# Patient Record
Sex: FEMALE | NOT HISPANIC OR LATINO | Employment: UNEMPLOYED | URBAN - METROPOLITAN AREA
[De-identification: names, ages, dates, MRNs, and addresses within clinical notes are randomized per-mention and may not be internally consistent; named-entity substitution may affect disease eponyms.]

---

## 2021-12-22 ENCOUNTER — OFFICE VISIT (OUTPATIENT)
Dept: FAMILY MEDICINE | Facility: CLINIC | Age: 31
End: 2021-12-22
Payer: MEDICAID

## 2021-12-22 VITALS
BODY MASS INDEX: 44.23 KG/M2 | WEIGHT: 275.2 LBS | HEIGHT: 66 IN | TEMPERATURE: 98 F | HEART RATE: 103 BPM | OXYGEN SATURATION: 100 % | SYSTOLIC BLOOD PRESSURE: 124 MMHG | DIASTOLIC BLOOD PRESSURE: 87 MMHG

## 2021-12-22 DIAGNOSIS — E03.8 OTHER SPECIFIED HYPOTHYROIDISM: ICD-10-CM

## 2021-12-22 DIAGNOSIS — E03.9 HYPOTHYROIDISM, UNSPECIFIED TYPE: ICD-10-CM

## 2021-12-22 DIAGNOSIS — F89 DEVELOPMENTAL DISORDER: ICD-10-CM

## 2021-12-22 DIAGNOSIS — K59.00 CONSTIPATION, UNSPECIFIED CONSTIPATION TYPE: ICD-10-CM

## 2021-12-22 DIAGNOSIS — Z00.00 ROUTINE GENERAL MEDICAL EXAMINATION AT A HEALTH CARE FACILITY: ICD-10-CM

## 2021-12-22 DIAGNOSIS — E11.9 TYPE 2 DIABETES MELLITUS WITHOUT COMPLICATION, WITH LONG-TERM CURRENT USE OF INSULIN (H): Primary | ICD-10-CM

## 2021-12-22 DIAGNOSIS — E66.01 MORBID OBESITY (H): ICD-10-CM

## 2021-12-22 DIAGNOSIS — Z79.4 TYPE 2 DIABETES MELLITUS WITHOUT COMPLICATION, WITH LONG-TERM CURRENT USE OF INSULIN (H): Primary | ICD-10-CM

## 2021-12-22 DIAGNOSIS — E78.2 MIXED HYPERLIPIDEMIA: ICD-10-CM

## 2021-12-22 LAB
CHOLEST SERPL-MCNC: 197 MG/DL
CREAT UR-MCNC: 181 MG/DL
FASTING STATUS PATIENT QL REPORTED: ABNORMAL
HBA1C MFR BLD: 7.9 % (ref 0–5.6)
HDLC SERPL-MCNC: 43 MG/DL
LDLC SERPL CALC-MCNC: 131 MG/DL
MICROALBUMIN UR-MCNC: 7.5 MG/DL (ref 0–1.99)
MICROALBUMIN/CREAT UR: 41.4 MG/G CR
T4 FREE SERPL-MCNC: 0.88 NG/DL (ref 0.7–1.8)
TRIGL SERPL-MCNC: 113 MG/DL
TSH SERPL DL<=0.005 MIU/L-ACNC: 8.62 UIU/ML (ref 0.3–5)

## 2021-12-22 PROCEDURE — 83036 HEMOGLOBIN GLYCOSYLATED A1C: CPT | Performed by: FAMILY MEDICINE

## 2021-12-22 PROCEDURE — 99214 OFFICE O/P EST MOD 30 MIN: CPT | Mod: 25 | Performed by: FAMILY MEDICINE

## 2021-12-22 PROCEDURE — 99385 PREV VISIT NEW AGE 18-39: CPT | Mod: 25 | Performed by: FAMILY MEDICINE

## 2021-12-22 PROCEDURE — 0004A COVID-19,PF,PFIZER (12+ YRS): CPT | Performed by: FAMILY MEDICINE

## 2021-12-22 PROCEDURE — 84439 ASSAY OF FREE THYROXINE: CPT | Performed by: FAMILY MEDICINE

## 2021-12-22 PROCEDURE — 80061 LIPID PANEL: CPT | Performed by: FAMILY MEDICINE

## 2021-12-22 PROCEDURE — 84443 ASSAY THYROID STIM HORMONE: CPT | Performed by: FAMILY MEDICINE

## 2021-12-22 PROCEDURE — 91300 COVID-19,PF,PFIZER (12+ YRS): CPT | Performed by: FAMILY MEDICINE

## 2021-12-22 PROCEDURE — 82043 UR ALBUMIN QUANTITATIVE: CPT | Performed by: FAMILY MEDICINE

## 2021-12-22 PROCEDURE — 36415 COLL VENOUS BLD VENIPUNCTURE: CPT | Performed by: FAMILY MEDICINE

## 2021-12-22 RX ORDER — LISINOPRIL 2.5 MG/1
5 TABLET ORAL DAILY
Qty: 30 TABLET | Refills: 3 | Status: SHIPPED | OUTPATIENT
Start: 2021-12-22 | End: 2022-05-04

## 2021-12-22 RX ORDER — LEVOTHYROXINE SODIUM 200 UG/1
200 TABLET ORAL DAILY
COMMUNITY
End: 2021-12-22

## 2021-12-22 RX ORDER — HYDROXYZINE HYDROCHLORIDE 50 MG/1
50 TABLET, FILM COATED ORAL AT BEDTIME
Qty: 30 TABLET | Refills: 3 | Status: SHIPPED | OUTPATIENT
Start: 2021-12-22 | End: 2022-04-26

## 2021-12-22 RX ORDER — FOLIC ACID 1 MG/1
1 TABLET ORAL DAILY
COMMUNITY
End: 2021-12-22

## 2021-12-22 RX ORDER — LEVOTHYROXINE SODIUM 25 UG/1
25 TABLET ORAL DAILY
COMMUNITY
End: 2021-12-22

## 2021-12-22 RX ORDER — LEVOTHYROXINE SODIUM 200 UG/1
200 TABLET ORAL DAILY
Qty: 90 TABLET | Refills: 1 | Status: SHIPPED | OUTPATIENT
Start: 2021-12-22 | End: 2023-06-08

## 2021-12-22 RX ORDER — RISPERIDONE 2 MG/1
2 TABLET ORAL DAILY
Qty: 60 TABLET | Refills: 1 | Status: SHIPPED | OUTPATIENT
Start: 2021-12-22 | End: 2022-05-04

## 2021-12-22 RX ORDER — CALCIUM POLYCARBOPHIL 625 MG 625 MG/1
1 TABLET ORAL DAILY
Qty: 90 TABLET | Refills: 1 | Status: SHIPPED | OUTPATIENT
Start: 2021-12-22 | End: 2022-05-04

## 2021-12-22 RX ORDER — INSULIN GLARGINE 100 [IU]/ML
58 INJECTION, SOLUTION SUBCUTANEOUS AT BEDTIME
COMMUNITY
End: 2021-12-22

## 2021-12-22 RX ORDER — ATORVASTATIN CALCIUM 80 MG/1
80 TABLET, FILM COATED ORAL DAILY
Qty: 90 TABLET | Refills: 1 | Status: SHIPPED | OUTPATIENT
Start: 2021-12-22 | End: 2022-01-06

## 2021-12-22 RX ORDER — LISINOPRIL 2.5 MG/1
2.5 TABLET ORAL DAILY
Qty: 30 TABLET | Refills: 3 | Status: SHIPPED | OUTPATIENT
Start: 2021-12-22 | End: 2021-12-22

## 2021-12-22 RX ORDER — HYDROXYZINE HYDROCHLORIDE 25 MG/1
50 TABLET, FILM COATED ORAL AT BEDTIME
COMMUNITY
End: 2021-12-22

## 2021-12-22 RX ORDER — ACETAMINOPHEN 325 MG/1
325-650 TABLET ORAL EVERY 6 HOURS PRN
COMMUNITY
End: 2022-05-04

## 2021-12-22 RX ORDER — METFORMIN HCL 500 MG
2000 TABLET, EXTENDED RELEASE 24 HR ORAL
Qty: 120 TABLET | Refills: 3 | Status: SHIPPED | OUTPATIENT
Start: 2021-12-22 | End: 2022-04-26

## 2021-12-22 RX ORDER — ATORVASTATIN CALCIUM 40 MG/1
40 TABLET, FILM COATED ORAL DAILY
COMMUNITY
End: 2021-12-22

## 2021-12-22 RX ORDER — GLIMEPIRIDE 4 MG/1
4 TABLET ORAL
COMMUNITY
End: 2021-12-22

## 2021-12-22 RX ORDER — PANTOPRAZOLE SODIUM 40 MG/1
40 TABLET, DELAYED RELEASE ORAL DAILY
COMMUNITY
End: 2022-01-07

## 2021-12-22 RX ORDER — FOLIC ACID 1 MG/1
1 TABLET ORAL DAILY
Qty: 90 TABLET | Refills: 1 | Status: SHIPPED | OUTPATIENT
Start: 2021-12-22 | End: 2022-05-04

## 2021-12-22 RX ORDER — DIVALPROEX SODIUM 250 MG/1
250 TABLET, DELAYED RELEASE ORAL 3 TIMES DAILY
COMMUNITY
End: 2021-12-22

## 2021-12-22 RX ORDER — CALCIUM CARBONATE 500 MG/1
1 TABLET, CHEWABLE ORAL 2 TIMES DAILY
COMMUNITY
End: 2022-05-04

## 2021-12-22 RX ORDER — RISPERIDONE 2 MG/1
1 TABLET ORAL DAILY
COMMUNITY
End: 2021-12-22

## 2021-12-22 RX ORDER — LEVOTHYROXINE SODIUM 50 UG/1
50 TABLET ORAL DAILY
Qty: 60 TABLET | Refills: 0 | Status: SHIPPED | OUTPATIENT
Start: 2021-12-22 | End: 2022-03-15

## 2021-12-22 RX ORDER — MEDROXYPROGESTERONE ACETATE 150 MG/ML
150 INJECTION, SUSPENSION INTRAMUSCULAR
COMMUNITY

## 2021-12-22 RX ORDER — POLYETHYLENE GLYCOL 3350 17 G/17G
1 POWDER, FOR SOLUTION ORAL DAILY
COMMUNITY
End: 2022-01-07

## 2021-12-22 RX ORDER — DIVALPROEX SODIUM 250 MG/1
250 TABLET, DELAYED RELEASE ORAL 3 TIMES DAILY
Qty: 90 TABLET | Refills: 3 | Status: SHIPPED | OUTPATIENT
Start: 2021-12-22 | End: 2022-01-11

## 2021-12-22 RX ORDER — DIVALPROEX SODIUM 500 MG/1
1500 TABLET, EXTENDED RELEASE ORAL AT BEDTIME
Qty: 90 TABLET | Refills: 3 | Status: SHIPPED | OUTPATIENT
Start: 2021-12-22 | End: 2022-05-04

## 2021-12-22 RX ORDER — IBUPROFEN 200 MG
200 TABLET ORAL EVERY 4 HOURS PRN
COMMUNITY

## 2021-12-22 RX ORDER — GUAIFENESIN AND DEXTROMETHORPHAN HYDROBROMIDE 100; 10 MG/5ML; MG/5ML
5 SOLUTION ORAL EVERY 4 HOURS PRN
COMMUNITY
End: 2022-01-07

## 2021-12-22 ASSESSMENT — MIFFLIN-ST. JEOR: SCORE: 1990.05

## 2021-12-22 NOTE — PROGRESS NOTES
"  Female Physical Note    Concerns today:   Needs refills of all of meds  Just moved here from [place]. Has a history of generalized developmental disorder and had been living in a group home in [place] (which is where her mom lives). She was not doing well at this group home so dad decided to move her to [place]. She is now living with her father, Jonn, who is here with her at the visit today. She has been doing very well and there have been no behavior concerns (history of explosive disorder on problem list from [place]). They ran out of meds in the end of November, and were seen at [facility]. They had to pay out of pocket for some of her meds because her [place] insurance hadn't processed yet.    She has a history of type II diabetes. She takes lantus 64 units and 14 units novolog with meals. She is also on 2,000 mg of metformin. Checks blood sugars aboud three to four times a day    ROS:  CONSTITUTIONAL: no fatigue, no unexpected change in weight  SKIN: no worrisome rashes, no worrisome moles, no worrisome lesions  EYES: no acute vision problems or changes  ENT: no ear problems, no mouth problems, no throat problems  RESP: no significant cough, no shortness of breath  CV: no chest pain, no palpitations, no new or worsening peripheral edema  GI: no nausea, no vomiting, no constipation, no diarrhea    Sexually Active: No- had been on depo in the past for \"irregular periods\"; currently has period without much concerns  Sexual concerns: No   Contraception:not needed    Menarche: currently on period No LMP recorded.   STD History: Neg  Last Pap Smear Date: unsure, she knows she's had them before   Abnormal Pap History: None    Patient Active Problem List   Diagnosis     Morbid obesity (H)       Current Outpatient Medications   Medication Sig Dispense Refill     acetaminophen (TYLENOL) 325 MG tablet Take 325-650 mg by mouth every 6 hours as needed for mild pain       atorvastatin (LIPITOR) 40 MG tablet Take " 40 mg by mouth daily       calcium carbonate (TUMS) 500 MG chewable tablet Take 1 chew tab by mouth 2 times daily       Dextromethorphan-guaiFENesin  MG/5ML syrup Take 5 mLs by mouth every 4 hours as needed for cough       divalproex sodium delayed-release (DEPAKOTE) 250 MG DR tablet Take 250 mg by mouth 3 times daily       folic acid (FOLVITE) 1 MG tablet Take 1 mg by mouth daily       glimepiride (AMARYL) 4 MG tablet Take 4 mg by mouth every morning (before breakfast)       hydrOXYzine (ATARAX) 25 MG tablet Take 25 mg by mouth 3 times daily as needed for itching       ibuprofen (ADVIL/MOTRIN) 200 MG tablet Take 200 mg by mouth every 4 hours as needed for mild pain       insulin glargine (LANTUS VIAL) 100 UNIT/ML vial Inject 58 Units Subcutaneous At Bedtime       levothyroxine (SYNTHROID/LEVOTHROID) 200 MCG tablet Take 200 mcg by mouth daily       levothyroxine (SYNTHROID/LEVOTHROID) 25 MCG tablet Take 25 mcg by mouth daily       medroxyPROGESTERone (DEPO-PROVERA) 150 MG/ML IM injection Inject 150 mg into the muscle every 3 months       pantoprazole (PROTONIX) 40 MG EC tablet Take 40 mg by mouth daily       polyethylene glycol (MIRALAX) 17 GM/Dose powder Take 1 capful by mouth daily       risperiDONE (RISPERDAL) 2 MG tablet Take 1 mg by mouth daily       ABILIFY 15 MG OR TABS 1 tab q HS  0     CLONAZEPAM 0.5 MG OR TABS Takes 0.25mg 1 tab at 5pm  0     CLONIDINE HCL 0.1 MG OR TABS 1/2 tabBID  0     DEBROX 6.5 % OT SOLN apply 5 drops qd for 5 days qs 0     TOPAMAX 25 MG OR TABS 1 tab po q am, 2 tabs q pm  0       Past Medical History:   Diagnosis Date     Alcohol affecting fetus or  via placenta or breast milk      Attention deficit disorder with hyperactivity(314.01)      Obsessive-compulsive disorders      Unspecified delay in development(315.9)             Problem List Medication List and Allergy List were reviewed.    Patient is a new patient to this clinic.    Social History     Tobacco Use      [FreeTextEntry3] : Patient was seen seen examined with NP Annie, agree with above plan of care.\par  "Smoking status: Never Smoker     Smokeless tobacco: Never Used   Substance Use Topics     Alcohol use: Not on file     Single  Children ? no    Has anyone hurt you physically, for example by pushing, hitting, slapping or kicking you or forcing you to have sex? Denies  Do you feel threatened or controlled by a partner, ex-partner or anyone in your life? Denies    RISK BEHAVIORS AND HEALTHY HABITS:  Tobacco Use/Smoking: None  Illicit Drug Use: None  Do you use alcohol? No  Diet (5-7 servings of fruits/veg daily): No   Exercise (30 min accumulated most days):No  Dental Care: not yet in [place]   Calcium 1500 mg/d:  No  Seat Belt Use: Yes     Pap every 3 years for women 21-29. Recommended and patient accepted testing- would like to come back with female friend of the family for support at later date    Immunization History   Administered Date(s) Administered     DT (PEDS <7y) 07/24/1997     Hep B, Peds or Adolescent 1992     HepB 1992, 08/16/1993, 12/22/1993     HepB, Unspecified 08/16/1993, 12/22/1993     Hib (PRP-T) 01/12/1993, 04/19/1993, 11/15/1994     Hib, Unspecified 01/12/1993, 04/19/1993, 11/15/1994     Historical DTP/aP 01/12/1993, 04/19/1993, 08/16/1993, 11/15/1994, 07/24/1997     Influenza (IIV3) PF 02/15/2005     MMR 11/15/1994, 08/28/2000     OPV, trivalent, live 01/12/1993, 04/19/1993, 11/15/1994, 07/24/1997     Polio, Unspecified  01/12/1993, 04/19/1993, 11/15/1994, 07/24/1997     Td (Adult), Adsorbed 03/30/2004     Varicella 02/15/2005    Reviewed Immunization Record Today- patient reports that she had the flu shot in [place]. She has had two covid vaccines but has not yet had the booster, and would like this today.     EXAMINATION:   /87   Pulse 103   Temp 98  F (36.7  C)   Ht 1.676 m (5' 6\")   Wt 124.8 kg (275 lb 3.2 oz)   SpO2 100%   BMI 44.42 kg/m    GENERAL: healthy, alert and no distress  EYES: Eyes grossly normal to inspection, extraocular movements - intact, and PERRL  HENT: " ear canals- normal; TMs- normal; Nose- normal; Mouth- no ulcers, no lesions  NECK: no tenderness, no adenopathy, no asymmetry, no masses, no stiffness; thyroid- normal to palpation  RESP: lungs clear to auscultation - no rales, no rhonchi, no wheezes  BREAST: deferred by patient  CV: regular rates and rhythm, normal S1 S2, no S3 or S4 and no murmur, no click or rub -  ABDOMEN: soft, no tenderness, no  hepatosplenomegaly, no masses, normal bowel sounds  MS: extremities- no gross deformities noted, no edema  SKIN: no suspicious lesions, no rashes  NEURO: strength and tone- normal, sensory exam- grossly normal, mentation- intact, speech- normal, reflexes- symmetric  BACK: no CVA tenderness, no paralumbar tenderness  - deferred by patient  PSYCH: Alert and oriented times 3; speech- coherent , normal rate and volume; able to articulate logical thoughts, able to abstract reason, no tangential thoughts, no hallucinations or delusions, affect- normal  LYMPHATICS: ant. cervical- normal, post. cervical- normal, axillary- normal, supraclavicular- normal, inguinal- normal    ASSESSMENT:  1. Type 2 diabetes mellitus without complication, with long-term current use of insulin (H)  Refilled metformin 2,000 mg daily (changed from immediate to extended release), refilled lantus 64 units daily, novolog 14 units with meals.   Was on glimepiride per med list (wasn't refilled by [facility]); agree that shouldn't be on both insulin and sulfonylurea. A1C today a little above goal at 7.9; could consider starting GLP-1 agonist in the future.  Micoalbuminuria increased today at 41; will increase lisinopril to 5 mg (at 2.5), BP will allow for this today  Refilled supplies and for   - Hemoglobin A1c; Future  - Lipid Profile; Future  - ALCOHOL WIPES PER BOX  - insulin glargine (LANTUS PEN) 100 UNIT/ML pen; Inject 64 Units Subcutaneous every morning  Dispense: 15 mL; Refill: 1  - insulin aspart (NOVOLOG PEN) 100 UNIT/ML pen; Inject 14 Units  "Subcutaneous 3 times daily (with meals)  Dispense: 15 mL; Refill: 1  - insulin pen needle (32G X 4 MM) 32G X 4 MM miscellaneous; Use 4 pen needles daily or as directed.  Dispense: 240 each; Refill: 1  - blood glucose (NO BRAND SPECIFIED) lancets standard; Use to test blood sugar 4 times daily or as directed.  Dispense: 240 each; Refill: 1  - blood glucose (NO BRAND SPECIFIED) test strip; Use to test blood sugar 4 times daily or as directed.  Dispense: 300 strip; Refill: 1  - Albumin Random Urine Quantitative with Creat Ratio; Future  - metFORMIN (GLUCOPHAGE-XR) 500 MG 24 hr tablet; Take 4 tablets (2,000 mg) by mouth daily (with dinner)  Dispense: 120 tablet; Refill: 3  - Adult Mental Health Referral; Future  - Hemoglobin A1c  - Lipid Profile  - Albumin Random Urine Quantitative with Creat Ratio  - lisinopril (ZESTRIL) 2.5 MG tablet; Take 2 tablets (5 mg) by mouth daily  Dispense: 30 tablet; Refill: 3    2. Hypothyroidism  Not at goal with TSH of 8.62; increased synthroid dose from 225 to 250; should get TSH rechecked in 4-6 weeks   - levothyroxine (SYNTHROID/LEVOTHROID) 200 MCG tablet; Take 1 tablet (200 mcg) by mouth daily  Dispense: 90 tablet; Refill: 1  - levothyroxine (SYNTHROID/LEVOTHROID) 50 MCG tablet; Take 1 tablet (50 mcg) by mouth daily  Dispense: 60 tablet; Refill: 0  - TSH with free T4 reflex; Future  - TSH with free T4 reflex  - T4 free    3. Morbid obesity (H)  Would likely be a good candidate for a GLP-1; will wait for endocrine notes as unclear from dad if has type I or type II (problem list says type II which would be consistent with meds she's on)    4. Routine general medical examination at a health care facility  Will return for Pap smear at later date    5. Developmental disorder  History of \"explosive\" disorder per problem list  Patient's father is concerned that she is on too many medications; will try to get psych records from [facility] and send referral for psychiatrist. Will likely need " long term psychiatric care  - risperiDONE (RISPERDAL) 2 MG tablet; Take 1 tablet (2 mg) by mouth daily  Dispense: 60 tablet; Refill: 1  - divalproex sodium delayed-release (DEPAKOTE) 250 MG DR tablet; Take 1 tablet (250 mg) by mouth 3 times daily  Dispense: 90 tablet; Refill: 3  - folic acid (FOLVITE) 1 MG tablet; Take 1 tablet (1 mg) by mouth daily  Dispense: 90 tablet; Refill: 1  - hydrOXYzine (ATARAX) 50 MG tablet; Take 1 tablet (50 mg) by mouth At Bedtime  Dispense: 30 tablet; Refill: 3  - divalproex sodium extended-release (DEPAKOTE ER) 500 MG 24 hr tablet; Take 3 tablets (1,500 mg) by mouth At Bedtime  Dispense: 90 tablet; Refill: 3  - Adult Mental Health Referral; Future    6. Constipation, unspecified constipation type  - calcium polycarbophil (FIBERCON) 625 MG tablet; Take 1 tablet (625 mg) by mouth daily  Dispense: 90 tablet; Refill: 1    7. Mixed hyperlipidemia  LDL at 131, above goal; will increase atorvastatin from 40 mg to 80  - atorvastatin (LIPITOR) 80 MG tablet; Take 1 tablet (80 mg) by mouth daily  Dispense: 90 tablet; Refill: 1      Adri Saunders MD MD PGY3  [facility]    Patient was seen and discussed with Dr. Johnston who agrees with assessment and plan.

## 2021-12-23 ENCOUNTER — TELEPHONE (OUTPATIENT)
Dept: FAMILY MEDICINE | Facility: CLINIC | Age: 31
End: 2021-12-23
Payer: MEDICAID

## 2021-12-23 NOTE — TELEPHONE ENCOUNTER
Spoke briefly with father Jonn and answered his questions. He was inquiring if Apple should take all of her medications prescribed yesterday on top of her old medications. Instructed that she should only take the medications prescribed by Dr. Meng yesterday, as these are the medications prescribed by her prior provider with a few dose adjustments.    Also offered CGM for patient as it would be covered and on multiple daily injections. Father will ask patient if she is interested.    Miles Chen, PharmD, [facility]  PGY1 Ambulatory Care Pharmacy Resident

## 2021-12-23 NOTE — TELEPHONE ENCOUNTER
Pt was prescribed new meds yesterday and dad would like to go over the meds with a nurse.  Please call.

## 2021-12-26 NOTE — TELEPHONE ENCOUNTER
I have verified the content of the note, which accurately reflects my assessment of the patient and the plan of care.   Selina Nolasco, [facility], PharmD

## 2022-01-06 ENCOUNTER — VIRTUAL VISIT (OUTPATIENT)
Dept: PHARMACY | Facility: CLINIC | Age: 32
End: 2022-01-06
Payer: MEDICAID

## 2022-01-06 ENCOUNTER — TELEPHONE (OUTPATIENT)
Dept: FAMILY MEDICINE | Facility: CLINIC | Age: 32
End: 2022-01-06

## 2022-01-06 DIAGNOSIS — E78.2 MIXED HYPERLIPIDEMIA: ICD-10-CM

## 2022-01-06 DIAGNOSIS — Z71.89 ENCOUNTER FOR MEDICATION REVIEW AND COUNSELING: Primary | ICD-10-CM

## 2022-01-06 PROBLEM — R62.50 DELAY IN DEVELOPMENT: Status: ACTIVE | Noted: 2022-01-06

## 2022-01-06 PROBLEM — Z78.9 LIVES IN GROUP HOME: Status: RESOLVED | Noted: 2022-01-06 | Resolved: 2022-01-06

## 2022-01-06 PROBLEM — Z78.9 LIVES IN GROUP HOME: Status: ACTIVE | Noted: 2022-01-06

## 2022-01-06 PROCEDURE — 99207 PR NO CHARGE LOS: CPT | Performed by: PHARMACIST

## 2022-01-06 RX ORDER — ATORVASTATIN CALCIUM 80 MG/1
80 TABLET, FILM COATED ORAL DAILY
Qty: 90 TABLET | Refills: 3 | Status: SHIPPED | OUTPATIENT
Start: 2022-01-06 | End: 2022-05-04

## 2022-01-06 NOTE — TELEPHONE ENCOUNTER
Father called and has some questions on which medications to take in the morning and which ones to take in the evening. The doses changed and with the new pills, he would like some clarification.    Route to the [facility].    Nikki Chavez

## 2022-01-06 NOTE — PROGRESS NOTES
Clinical Pharmacy Consult:                                                    Apple Holliday is a 31 year old female called for a clinical pharmacist consult.  She was referred to me from clinic staff. Information was obtained today from patient's father Jonn.      Reason for Consult: Questions about timing of medicine doses.     Discussion:   1. The patient visited [facility] on 11/22 and 12/8 with her medications prescribed on 11/22 and 12/20. She visited [facility] on 12/22 and had dose changes for atorvastatin, divalproex, levothyroxine, lisinopril, metformin and risperidone. The patient's father requested for clarifications on the patient's new medication regimens.   2. The patient kept atorvastatin 40 mg tablets from an outdated order and has not received atorvastatin 80 mg tablets. The patient's father acknowledged that he should  the new prescription from [facility] at [facility].  3. Informed the patient to take 3 tablets of divalproex 500 mg ER before bedtime.  4. The patient kept levothyroxine 25 mcg tablets from her visit with [facility]. She did not have the levothyroxine 50 mcg and 200 mcg tablets.   5. Informed the patient to take 4 tablets of metformin 500 mg ER with dinner instead of metformin 1000 mg two times daily.   6. Informed the patient to take 1 tablet of risperidone 2 mg daily at night and stop using risperidone 1 mg tablets.   7. The patient's use of calcium polycarbophil, folic acid, hydroxyzine, insulin, lisinopril, pantoprazole, and polyethylene glycol was not able to be evaluated due to time limit.     Plan:  1. Informed the patient to take 3 tablets of Divalproex 500 mg ER before bedtime.  2. Informed the patient to take 4 tablets of Metformin 500 mg ER before dinner.  3. Scheduled in-person visit on 1/7 at 8 am to complete the medication review.  4. Sent new Rx for previously prescribed Atorvastatin 80 mg to see if now fillable by insurance.     Future to do's:  - Clarify use of calcium  polycarbophil, folic acid, hydroxyzine, insulin, lisinopril, pantoprazole, and polyethylene glycol.  - Clarify Levothyroxine use, especially given uncontrolled TSH on last check  - Clarify divalproex use, unclear how the patient is taking the medication currently.   - Clarify glucose monitoring    Cheryl Peralta  PharmD Candidate 2022    Preceptor Attestation:  I was present with the pharmacy student who participated in the service and in the documentation of this note. I have verified the history, personally performed the medical decision making, and have verified the content of the note, which accurately reflects my assessment of the patient and the plan of care.     Alvina Valencia, PharmD, [facility] (previously, CDE)  [facility] [address], [place] [address]  Phone: [phone]  January 7, 2022 at 8:20 AM

## 2022-01-07 ENCOUNTER — OFFICE VISIT (OUTPATIENT)
Dept: PHARMACY | Facility: CLINIC | Age: 32
End: 2022-01-07
Payer: MEDICAID

## 2022-01-07 DIAGNOSIS — F99 MENTAL HEALTH DISORDER: ICD-10-CM

## 2022-01-07 DIAGNOSIS — E11.65 TYPE 2 DIABETES MELLITUS WITH HYPERGLYCEMIA, UNSPECIFIED WHETHER LONG TERM INSULIN USE (H): Primary | ICD-10-CM

## 2022-01-07 DIAGNOSIS — K21.9 GASTROESOPHAGEAL REFLUX DISEASE, UNSPECIFIED WHETHER ESOPHAGITIS PRESENT: ICD-10-CM

## 2022-01-07 DIAGNOSIS — E03.9 HYPOTHYROIDISM, UNSPECIFIED TYPE: ICD-10-CM

## 2022-01-07 DIAGNOSIS — E78.2 MIXED HYPERLIPIDEMIA: ICD-10-CM

## 2022-01-07 DIAGNOSIS — E11.29 MICROALBUMINURIA DUE TO TYPE 2 DIABETES MELLITUS (H): ICD-10-CM

## 2022-01-07 DIAGNOSIS — R80.9 MICROALBUMINURIA DUE TO TYPE 2 DIABETES MELLITUS (H): ICD-10-CM

## 2022-01-07 PROCEDURE — 99607 MTMS BY PHARM ADDL 15 MIN: CPT | Performed by: PHARMACIST

## 2022-01-07 PROCEDURE — 99605 MTMS BY PHARM NP 15 MIN: CPT | Performed by: PHARMACIST

## 2022-01-07 RX ORDER — PANTOPRAZOLE SODIUM 40 MG/1
40 TABLET, DELAYED RELEASE ORAL DAILY
Qty: 30 TABLET | Refills: 1 | Status: SHIPPED | OUTPATIENT
Start: 2022-01-07 | End: 2022-03-25

## 2022-01-07 NOTE — PROGRESS NOTES
Medication Therapy Management (MTM) Encounter    ASSESSMENT:                            Medication Adherence/Access: Recommended assistance to set up med boxes. Would benefit from bringing in all medications to complete full med rec.    Type 2 Diabetes: Not controlled. The patient's A1C was 7.9%--not at goal of < 7 %. Would benefit from GLP-1 to achieve glycemic goals and facilitate weight loss. Deferred to next visit given main concern was pill box set up and missing medications today. Also would benefit from CGM given on multiple daily doses of insulin--patient would like to think about this and will reassess next week.     Microalbuminuria: Stable, yet hasn't received increased dose of lisinopril from last visit. Would benefit from pharmacy inquiry.     Mental Health: Appears stable. Would benefit from verification from prior psychiatry records given discrepancies in medication reconciliation of divalproex. For now, would benefit on maintaining on 1500 mg of divalproex ER at bedtime as pharmacy did not fill the  mg tablets.     Mixed Hyperlipidemia: LDL above goal of < 100. However, prescribed atorvastatin 80 mg daily but patient does not fall into a ACC/AHA statin benefit group. Would benefit from charts from prior health system/provider assessment next visit.     Hypothyroidism: Likely uncontrolled--not receiving fully prescribed dose of 250 mcg per day due to pharmacy issue. Patient will benefit from using 200 mcg until her next visit on 1/11/22.     GERD: Stable, would benefit from refill on pantoprazole today with considerations to switch to H2RA in the future.    Due to time constraints, I was only able to assess the above with the patient today. Will follow-up on other disease states in future encounters    PLAN:                            1. Set up a 7-day medication box for the patient:   Morning before food: levothyroxine   Morning: folic acid, pantoprazole, lisinopril, risperidone,  atorvastatin   Evening: Empty (place for divalproex 250 mg DR)   Bedtime: divalproex 1500 mg ER, hydroxyzine, metformin  2. Take levothyroxine 200 mcg daily in the morning before food until 1/11--then we will add 50 mcg tablets to box.  3. The patient will consider starting CGM.   4. Called pharmacy to refill all medications that were missing from box.     Follow-up: 1/11 at 8 am with Dr. Chen.      Medication issues to be addressed at a future visit      Receive levothyroxine 50 mcg, atorvastatin 80 mg, and divalproex 250 mg DR tablets from the pharmacy and add to box next visit.    Discuss use of divalproex 250 mg DR tablets.    Discuss use of LANTUS and NOVOLOG.    Discuss starting CGM.    Discuss the rest of the patient's medication supplies--father bringing in ALL MEDICATIONS next visit.    Try to get records from patient's prior health system--unclear why patient is on atorvastatin.     Assess GERD w/ pantoprazole and consider swap to H2RA    SUBJECTIVE/OBJECTIVE:                          Apple Holliday is a 31 year old female coming in for an initial visit. Her father hope to have clarification of her current medication regimens. Patient was accompanied by her father Jonn.     Reason for visit: Clarification for medication regimens.    Allergies/ADRs: Reviewed in chart  Past Medical History: Reviewed in chart  Social History     Tobacco Use     Smoking status: Never Smoker     Smokeless tobacco: Never Used   Substance Use Topics     Alcohol use: Not on file     Drug use: Not on file   ^Reviewed today    Medication Adherence/Access: Medication barriers: polypharmacy. Brought in most recently prescribed medications. Does not have pill box but requests one today. Father states she has a ton of more medications at home and is willing to bring these in next visit to determine which ones he can get rid of and to avoid confusion.      Type 2 Diabetes:  Currently taking metformin 2000 mg, Novolog, and Lantus.  Patient is not experiencing side effects. Also taking cinnamon OTC.   Blood sugar monitorin time(s) daily. Ranges (patient reported): FPG Mid 150s  Symptoms of low blood sugar? none  Symptoms of high blood sugar? none  Diet/Exercise: Not addressed due to time  Aspirin: Not taking due to low risk of ASCVD  The ASCVD Risk score (Mena BAZZI Jr., et al., 2013) failed to calculate for the following reasons:    The 2013 ASCVD risk score is only valid for ages 40 to 79  Statin: Yes: Atorvastatin 80 mg daily   ACEi/ARB: Yes: lisinopril 5 mg daily.   Urine Albumin: No results found for: UMALCR   Lab Results   Component Value Date    A1C 7.9 2021    A1C 5.9 04/15/2003    A1C 6.1 2003     Most Recent Immunizations   Administered Date(s) Administered     COVID-19,PF,Pfizer (12+ Yrs) 2021     DT (PEDS <7y) 1997     Hep B, Peds or Adolescent 1992     HepB 1993     HepB, Unspecified 1993     Hib (PRP-T) 11/15/1994     Hib, Unspecified 11/15/1994     Historical DTP/aP 1997     Influenza (IIV3) PF 02/15/2005     MMR 2000     OPV, trivalent, live 1997     Polio, Unspecified  1997     Td (Adult), Adsorbed 2004     Varicella 02/15/2005      Microalbuminuria: Prescribed lisinopril 5 mg daily and denies side effects. Only taking 2.5 mg right now as was not able to receive 5 mg tablets from pharmacy.     Mental Health: Patient was prescribed divalproex  mg to take three times daily last visit and chart from [facility] suggests it should be 750 mg every morning. Also prescribed divalproex ER 1500 mg at bedtime. Only received the ER tablets from the pharmacy.     Mixed Hyperlipidemia: Prescribed atorvastatin 40 mg daily at [facility] system, increased to 80 mg last visit. No reports of side effects. No indication for secondary prevention on file.   Cholesterol   Date Value Ref Range Status   2021 197 <=199 mg/dL Final     Direct Measure HDL  "  Date Value Ref Range Status   12/22/2021 43 (L) >=50 mg/dL Final     Comment:     HDL Cholesterol Reference Range:     0-2 years:   No reference ranges established for patients under 2 years old  at [facility] for lipid analytes.    2-8 years:  Greater than 45 mg/dL     18 years and older:   Female: Greater than or equal to 50 mg/dL   Male:   Greater than or equal to 40 mg/dL     LDL Cholesterol Calculated   Date Value Ref Range Status   12/22/2021 131 (H) <=129 mg/dL Final     Triglycerides   Date Value Ref Range Status   12/22/2021 113 <=149 mg/dL Final     Hypothyroidism: The patient was prescribed with levothyroxine 250 mcg of daily from a recent visit. Her TSH value was high at 8.62 on 12/22 and she did not receive the levothyroxine 50 mcg from the pharmacy.   TSH   Date Value Ref Range Status   12/22/2021 8.62 (H) 0.30 - 5.00 uIU/mL Final     GERD: Taking pantoprazole 40 mg daily and no reports of side effects. Only has 5 pills remaining.     BP Readings from Last 1 Encounters:   12/22/21 124/87     Pulse Readings from Last 1 Encounters:   12/22/21 103     Wt Readings from Last 1 Encounters:   12/22/21 275 lb 3.2 oz (124.8 kg)     Ht Readings from Last 1 Encounters:   12/22/21 5' 6\" (1.676 m)     Estimated body mass index is 44.42 kg/m  as calculated from the following:    Height as of 12/22/21: 5' 6\" (1.676 m).    Weight as of 12/22/21: 275 lb 3.2 oz (124.8 kg).    Temp Readings from Last 1 Encounters:   12/22/21 98  F (36.7  C)     ----------------  I spent 60 minutes with this patient today. All changes were made via collaborative practice agreement with Dr. Meng. A copy of the visit note was provided to the patient's primary care provider.    The patient was given a summary of these recommendations. See Provider note/AVS from today.     Cheryl Peralta  PharmD Candidate 2022     Medication Therapy Recommendations  Diabetes mellitus, type 2 (H)    Current Medication: metFORMIN (GLUCOPHAGE-XR) " 500 MG 24 hr tablet   Rationale: Patient forgets to take - Adherence - Adherence   Recommendation: Provide Adherence Intervention   Status: Accepted - no CPA Needed              I was present with the pharmacy student who participated in the service and in the documentation of this note. I have verified the history, personally performed the medical decision making, and have verified the content of the note, which accurately reflects my assessment of the patient and the plan of care.     Miles Chen, PharmD, [facility]

## 2022-01-07 NOTE — PATIENT INSTRUCTIONS
Recommendations from today's MTM visit:                                                    MTM (medication therapy management) is a service provided by a clinical pharmacist designed to help you get the most of out of your medicines.   Today we reviewed what your medicines are for, how to know if they are working, that your medicines are safe and how to make your medicine regimen as easy as possible.      Medications to :  -Atorvastatin 80 mg  -Divalproex  mg  -Lisinopril 5 mg  -Lantus  -Pen Needles  -Levothyroxine 200 mcg  -Levothyroxine 25 mcg  -Pantoprazole 40 mg     Follow-up: 1/11/22    It was great to speak with you today!  I value your experience and would be very thankful for your time with providing feedback on our clinic survey. You may receive a survey via email or text message in the next few days.     To schedule another MTM appointment, please call the clinic directly or you may call the MTM scheduling line at [phone] or toll-free at [phone].     My Clinical Pharmacist's contact information:                                                      Please feel free to contact me with any questions or concerns you have!     Miles Chen, PharmD, [facility]  PGY1 Ambulatory [place] [address]  Office Phone: [phone]

## 2022-01-08 RX ORDER — AMPICILLIN TRIHYDRATE 250 MG
500 CAPSULE ORAL DAILY
COMMUNITY
Start: 2022-01-08 | End: 2022-05-04

## 2022-01-08 NOTE — PROGRESS NOTES
I have verified the content of the note, which accurately reflects my assessment of the patient and the plan of care.   Selina Nolasco, [facility], PharmD

## 2022-01-11 ENCOUNTER — OFFICE VISIT (OUTPATIENT)
Dept: PHARMACY | Facility: CLINIC | Age: 32
End: 2022-01-11
Payer: MEDICAID

## 2022-01-11 DIAGNOSIS — F99 MENTAL HEALTH DISORDER: ICD-10-CM

## 2022-01-11 DIAGNOSIS — E11.65 TYPE 2 DIABETES MELLITUS WITH HYPERGLYCEMIA, UNSPECIFIED WHETHER LONG TERM INSULIN USE (H): Primary | ICD-10-CM

## 2022-01-11 DIAGNOSIS — E78.2 MIXED HYPERLIPIDEMIA: ICD-10-CM

## 2022-01-11 DIAGNOSIS — E03.9 HYPOTHYROIDISM, UNSPECIFIED TYPE: ICD-10-CM

## 2022-01-11 PROCEDURE — 99606 MTMS BY PHARM EST 15 MIN: CPT | Performed by: PHARMACIST

## 2022-01-11 PROCEDURE — 99607 MTMS BY PHARM ADDL 15 MIN: CPT | Performed by: PHARMACIST

## 2022-01-11 RX ORDER — DIVALPROEX SODIUM 250 MG/1
750 TABLET, DELAYED RELEASE ORAL EVERY MORNING
Qty: 90 TABLET | Refills: 3 | COMMUNITY
Start: 2022-01-11 | End: 2022-02-01

## 2022-01-11 NOTE — PATIENT INSTRUCTIONS
Recommendations from today's MTM visit:                                                    MTM (medication therapy management) is a service provided by a clinical pharmacist designed to help you get the most of out of your medicines.   Today we reviewed what your medicines are for, how to know if they are working, that your medicines are safe and how to make your medicine regimen as easy as possible.           Morning Before Breakfast:     Levothyroxine 200 mcg 1 tablet    Levothyroxine 50 mcg 1 tablet (missing currently)         Other Morning:     Atorvastatin 80 mg 1 tablet (missing currently) *put in a couple days of 40 mg     Folic acid 1 mg 1 tablet    Lisinopril 2.5 mg 2 tablets    Pantoprazole 40 mg 1 tablet    Risperidone 2 mg 1 tablet    Evening:     Metformin  mg 4 tablets    Divalproex  mg 3 tablets    Hydroxyzine 50 mg 1 tablet    Follow-up: In about 1 week with Dr. Chen for Mark Education    It was great to speak with you today!  I value your experience and would be very thankful for your time with providing feedback on our clinic survey. You may receive a survey via email or text message in the next few days.     To schedule another MTM appointment, please call the clinic directly or you may call the MTM scheduling line at [phone] or toll-free at [phone].     My Clinical Pharmacist's contact information:                                                      Please feel free to contact me with any questions or concerns you have!     Miles Chen, PharmD, [facility]  PGY1 Ambulatory [place] [address]  Office Phone: [phone]

## 2022-01-11 NOTE — PROGRESS NOTES
"Medication Therapy Management (MTM) Encounter    ASSESSMENT:                            Medication Adherence/Access: Would benefit from continued use of pill boxes.     Type 2 Diabetes: Near goal of < 7 %. Would benefit from GLP-1 to achieve glycemic goals and facilitate weight loss. Deferred to next visit given main concern was pill box set up and missing medications today. Also would benefit from CGM given on multiple daily doses of insulin--patient has thought about this and would like to do a \"trial\" in the next few weeks.     Mental Health: Controlled per patient and father.  For now, would benefit on maintaining on 1500 mg of divalproex ER at bedtime with consideration to to re-prescribe the historical morning dose next visit with PCP.     Mixed Hyperlipidemia: LDL above goal of < 100. However, prescribed atorvastatin 80 mg daily but patient does not fall into a ACC/AHA statin benefit group. Would benefit from charts from prior health system/provider assessment next visit.     Hypothyroidism: Likely uncontrolled--not receiving fully prescribed dose of 250 mcg per day due to pharmacy issue. Patient will benefit from refill of 50 mcg of levothyroxine at this time.    PLAN:                            1. Set up a 7-day medication box for the patient. Placed PRN and old medications in a separate bag. Provided father with list of medications so he can set up box in the future:        Morning Before Breakfast:     Levothyroxine 200 mcg 1 tablet    Levothyroxine 50 mcg 1 tablet (missing currently) *put in a few days of left over 25 mcg         Other Morning:     Atorvastatin 80 mg 1 tablet (missing currently) *put in a 4 days of 40 mg left over    Folic acid 1 mg 1 tablet    Lisinopril 2.5 mg 2 tablets    Pantoprazole 40 mg 1 tablet    Risperidone 2 mg 1 tablet    Evening:     Metformin  mg 4 tablets    Divalproex  mg 3 tablets    Hydroxyzine 50 mg 1 tablet    2. Bring CGM supplies to next visit!    3. " Spoke briefly with patient about once weekly injection Bydureon. Patient is interested starting next time.     4. Atorvastatin 80 mg and levothyroxine 50 mcg are ready to be picked up at pharmacy--father did not go this weekend to .     Follow-up:  with Dr. Chen and PCP     Medication issues to be addressed at a future visit      Make sure patient received atorvastatin and levothyroxine 50 mcg.     Discuss use of morning divalproex 250 mg DR tablets with PCP    CGM education next visit    Try to get records from patient's prior health system--unclear why patient is on atorvastatin. Was LDL > 190 at one time?    Reassess GERD w/ pantoprazole and consider swap to H2RA    Consider switching all mid-morning medications to evening to promote adherence (although didn't miss doses this week).     SUBJECTIVE/OBJECTIVE:                          Apple Holliday is a 31 year old female coming in for an follow-up visit.  Patient was accompanied by her father Jonn. This is a follow-up visit from 22.    Reason for visit: Med rec/MTM    Allergies/ADRs: Reviewed in chart  Past Medical History: Reviewed in chart  Social History     Tobacco Use     Smoking status: Never Smoker     Smokeless tobacco: Never Used   Substance Use Topics     Alcohol use: Not on file     Drug use: Not on file   ^Reviewed today    Medication Adherence/Access: Brought in ALL medications in big tote today, including many old pill packs.     Type 2 Diabetes:  Currently taking metformin XR 2000 mg, Novolog 14 units TID, and Lantus 64 units every morning. Patient is not experiencing side effects. Also taking cinnamon OTC.   Blood sugar monitorin time(s) daily. Ranges (patient reported): The last weeks FPGs have been 100-110s. No reports of feeling low or any reading below 80.   Symptoms of low blood sugar? none  Symptoms of high blood sugar? none  Diet/Exercise: Not addressed due to time  Aspirin: Not taking due to low risk of  "ASCVD  Statin: Yes: Prescribed atorvastatin 80 mg daily   ACEi/ARB: Yes: lisinopril 5 mg daily.   Urine Albumin: No results found for: UMALCR   Lab Results   Component Value Date    A1C 7.9 12/22/2021    A1C 5.9 04/15/2003    A1C 6.1 03/05/2003     Most Recent Immunizations   Administered Date(s) Administered     COVID-19,PF,Pfizer (12+ Yrs) 12/22/2021     DT (PEDS <7y) 07/24/1997     Hep B, Peds or Adolescent 1992     HepB 12/22/1993     HepB, Unspecified 12/22/1993     Hib (PRP-T) 11/15/1994     Hib, Unspecified 11/15/1994     Historical DTP/aP 07/24/1997     Influenza (IIV3) PF 02/15/2005     MMR 08/28/2000     OPV, trivalent, live 07/24/1997     Polio, Unspecified  07/24/1997     Td (Adult), Adsorbed 03/30/2004     Varicella 02/15/2005      Mental Health: Patient was prescribed divalproex  mg to take three times daily last visit and chart from [facility] suggests it should be 750 mg every morning. Upon further review today (based on directions from old pharmacy), patient indeed was prescribed 750 mg of DR every morning. Also prescribed divalproex ER 1500 mg at bedtime and is taking without side effects. Per patient and father, mental health is \"doing great\" recently.    Mixed Hyperlipidemia: Prescribed atorvastatin 40 mg daily at prior health system, increased to 80 mg last visit. No reports of side effects. No indication for secondary prevention on file.   Cholesterol   Date Value Ref Range Status   12/22/2021 197 <=199 mg/dL Final     Direct Measure HDL   Date Value Ref Range Status   12/22/2021 43 (L) >=50 mg/dL Final     Comment:     HDL Cholesterol Reference Range:     0-2 years:   No reference ranges established for patients under 2 years old  at [facility] for lipid analytes.    2-8 years:  Greater than 45 mg/dL     18 years and older:   Female: Greater than or equal to 50 mg/dL   Male:   Greater than or equal to 40 mg/dL     LDL Cholesterol Calculated   Date Value Ref " "Range Status   12/22/2021 131 (H) <=129 mg/dL Final     Triglycerides   Date Value Ref Range Status   12/22/2021 113 <=149 mg/dL Final     Hypothyroidism: The patient was prescribed with levothyroxine 250 mcg of daily from a recent visit. Her TSH value was high at 8.62 on 12/22 and she did not receive the levothyroxine 50 mcg from the pharmacy. She presents with old 25 mcg tablets from the old pill pack pharmacy.   TSH   Date Value Ref Range Status   12/22/2021 8.62 (H) 0.30 - 5.00 uIU/mL Final     BP Readings from Last 1 Encounters:   12/22/21 124/87     Pulse Readings from Last 1 Encounters:   12/22/21 103     Wt Readings from Last 1 Encounters:   12/22/21 275 lb 3.2 oz (124.8 kg)     Ht Readings from Last 1 Encounters:   12/22/21 5' 6\" (1.676 m)     Estimated body mass index is 44.42 kg/m  as calculated from the following:    Height as of 12/22/21: 5' 6\" (1.676 m).    Weight as of 12/22/21: 275 lb 3.2 oz (124.8 kg).    Temp Readings from Last 1 Encounters:   12/22/21 98  F (36.7  C)     ----------------  I spent 50 minutes with this patient today. All changes were made via collaborative practice agreement with Dr. Meng. A copy of the visit note was provided to the patient's primary care provider.    The patient was given a summary of these recommendations. See Provider note/AVS from today.     Miles Chen, PharmD, [facility]  PGY1 Ambulatory Care Pharmacy Resident     Medication Therapy Recommendations  Diabetes mellitus, type 2 (H)    Current Medication: Continuous Blood Gluc Sensor (FREESTYLE CESAR 2 SENSOR) MISC   Rationale: Medication requires monitoring - Needs additional monitoring   Recommendation: Self-Monitoring   Status: Accepted per CPA                      "

## 2022-01-13 NOTE — PROGRESS NOTES
I have verified the content of the note, which accurately reflects my assessment of the patient and the plan of care.   Selina Nolasco, [facility], PharmD

## 2022-01-19 ENCOUNTER — OFFICE VISIT (OUTPATIENT)
Dept: PHARMACY | Facility: CLINIC | Age: 32
End: 2022-01-19
Payer: MEDICAID

## 2022-01-19 DIAGNOSIS — Z71.89 ENCOUNTER FOR MEDICATION COUNSELING: Primary | ICD-10-CM

## 2022-01-19 PROCEDURE — 99207 PR NO CHARGE LOS: CPT | Performed by: PHARMACIST

## 2022-01-19 NOTE — PATIENT INSTRUCTIONS
Recommendations from today's MTM visit:                                                    MTM (medication therapy management) is a service provided by a clinical pharmacist designed to help you get the most of out of your medicines.   Today we reviewed what your medicines are for, how to know if they are working, that your medicines are safe and how to make your medicine regimen as easy as possible.            Scan sensor with reader at least every 8 hours.      Change sensor every 14 days.      If the sensor falls off, call [phone]. Follow the prompts to the main menu and select option 2 (Technical Product Support, Training, and Replacements)      Bring your  into every visit so we can go over the results with you!    It was great to speak with you today.  I value your experience and would be very thankful for your time with providing feedback on our clinic survey. You may receive a survey via email or text message in the next few days.     To schedule another MTM appointment, please call the clinic directly or you may call the MTM scheduling line at [phone] or toll-free at [phone].     My Clinical Pharmacist's contact information:                                                      Please feel free to contact me with any questions or concerns you have.      Miles Chen, PharmD, [facility]  PGY1 Ambulatory Care Pharmacy Resident

## 2022-01-19 NOTE — PROGRESS NOTES
Clinical Pharmacy Consult:                                                    Apple Holliday is a 31 year old female seen for a clinical pharmacist consult.  She was referred to me from Dr. Meng. Patient presents with father today.    Reason for Consult: Medication Education - Freestyle Mark    Discussion: Patient requires Freestyle Mark education to ensure understanding and safety for blood sugar monitoring.     Plan:  1. Instructed the patient on proper sensor placement (back of upper arm) and cleaning with an alcohol wipe before application.     2. Guided patient on the proper steps to apply the sensor and observed the patient while they appropriately applied the sensor.    3. Instructed the patient on how to sync a new sensor to the Freestyle Mark [place] and set the  BG range of .     4. Instructed the patient on how to check blood sugar with Freestyle Mark, including how to interpret trend arrows. Emphasized to swipe at least every 8 hours.    5. Discussed with patient the difference between fingerstick glucose readings and sensor glucose readings (sensor glucose readings are 10 minutes behind fingerstick glucose readings). Instructed patient to check fingerstick glucose when directed by the Freestyle Mark or if signs/symptoms of hypoglycemia occur.    6. Instructed patient to change sensor every 14 days. Provided patient with phone number to call if a sensor were to fall off or malfunction ([phone]).    7. Instructed patient to bring back [place] to every visit.    By the end of this education session, patient was able to verbalize understanding of this information and was able to appropriately self-apply the sensor under my supervision.    PD4 student Cheryl Peralta offered the above counseling while I completed the documentation of this session.    Miles Chen, PharmD, [facility]  PGY1 Ambulatory Care Pharmacy Resident

## 2022-01-20 NOTE — PROGRESS NOTES
I have verified the content of the note, which accurately reflects my assessment of the patient and the plan of care.   Alvina Mills, NICOLASA, PharmD

## 2022-01-24 ENCOUNTER — TELEPHONE (OUTPATIENT)
Dept: FAMILY MEDICINE | Facility: CLINIC | Age: 32
End: 2022-01-24
Payer: MEDICAID

## 2022-01-24 NOTE — TELEPHONE ENCOUNTER
[facility] phone call message- general phone call:    Reason for call: Pts father wondering about blood sugars scanner that goes into pts skin fell out wondering if the old one goes in or if a new goes back into place.     Return call needed: Yes    OK to leave a message on voice mail? Yes    Primary language: English      needed? No    Call taken on January 24, 2022 at 11:39 AM by Grisel Flores-Cardona

## 2022-01-24 NOTE — TELEPHONE ENCOUNTER
Called father (Jonn) back and left a voicemail message:    1) Need to replace sensor; do not try to put the old one back in the arm. Throw out the old sensor and place a new one.  2) Call the company for them to send a replacement sensor as insurance won't cover it at the pharmacy:   [phone]    Selina Nolasco, Pharm.D.

## 2022-01-26 ENCOUNTER — OFFICE VISIT (OUTPATIENT)
Dept: PHARMACY | Facility: CLINIC | Age: 32
End: 2022-01-26
Payer: MEDICAID

## 2022-01-26 DIAGNOSIS — E11.9 TYPE 2 DIABETES MELLITUS WITHOUT COMPLICATION, WITH LONG-TERM CURRENT USE OF INSULIN (H): ICD-10-CM

## 2022-01-26 DIAGNOSIS — Z79.4 TYPE 2 DIABETES MELLITUS WITHOUT COMPLICATION, WITH LONG-TERM CURRENT USE OF INSULIN (H): ICD-10-CM

## 2022-01-26 PROCEDURE — 99606 MTMS BY PHARM EST 15 MIN: CPT | Performed by: PHARMACIST

## 2022-01-26 NOTE — PATIENT INSTRUCTIONS
"  Recommendations from today's MTM visit:                                                    MTM (medication therapy management) is a service provided by a clinical pharmacist designed to help you get the most of out of your medicines.   Today we reviewed what your medicines are for, how to know if they are working, that your medicines are safe and how to make your medicine regimen as easy as possible.            Scan sensor with reader at least every 8 hours.      Change sensor every 14 days.      If the sensor falls off, call [phone]. Follow the prompts to the main menu and select option 2 (Technical Product Support, Training, and Replacements)      Bring your  into every visit so we can go over the results with you!      Adriel quick code: Hit \"2\", then when you hear \"Hi\" hit 711.    It was great to speak with you today.  I value your experience and would be very thankful for your time with providing feedback on our clinic survey. You may receive a survey via email or text message in the next few days.     To schedule another MTM appointment, please call the clinic directly or you may call the MTM scheduling line at [phone] or toll-free at [phone].     My Clinical Pharmacist's contact information:                                                      Please feel free to contact me with any questions or concerns you have.      Miles Chen, PharmD, [facility]  PGY1 Ambulatory Care Pharmacy Resident            "

## 2022-01-26 NOTE — Clinical Note
"One of the QAs I did recently did such a good job including immunizations in their note. I rarely ever do this. So just passing on the reminder to you to that we are supposed to include immunizations in our CMM efforts. I know, sounds a little \"eye-roll-y\". :D    With upcoming physician appointments schedules, if I think of it I go in and if appropriate timing pend due labs (in her case BMP). Help out the physician and close your \"care gaps\".     Good job using the subjective dot phrases (diabetes)! (Another thing I don't do). Maybe we should pass on to the documentation committee that the Urine albumin isn't pulling correctly? She just had one done and it doesn't pull?!"

## 2022-01-26 NOTE — PROGRESS NOTES
Medication Therapy Management (MTM) Encounter    ASSESSMENT:                            Medication Adherence/Access: See below for considerations    Type 2 Diabetes: Near A1C goal of < 7%. Four days worth of data on ambulatory glucose profile show time in range achieving goal of > 70%. Would benefit from GLP-1 to achieve glycemic goals and facilitate weight loss. Deferred to next visit per patient request. Today, would benefit from providing patient with resources for Mark replacement and strategies for sensor sticking as sensor fell off after 4 days of wearing.     PLAN:                            1. Provided patient with phone # for Mark sensor replacements.     2. Worked out insurance issue with pen needles at pharmacy. Medical supplies MUST be prescribed under an attending's name and not a resident's name for them to go through her insurance. Verbally change perscriber from Dr. Meng to Dr. Cisneros.     3. Provided patient with Mark adherence sticky and recommended strategies for adherence (skin tac, Mark band, pressing around edge of sensor after application, and making sure skin is completely dry when applying).    4. Sent RXs for fingerstick blood glucose monitoring supplies to pharmacy per patient request.    Follow-up: 2/1/22 with Dr. Taqueria Saunders. Blue-dotted for Dr. Nolasco that day for follow-up.    Medication issues to be addressed at a future visit:      GLP-1 and insulin taper next visit! Spoke with patient about Bydureon BCise last visit    Reassess CGM data/sensor falling off.    SUBJECTIVE/OBJECTIVE:                          Apple Holliday is a 31 year old female called for a follow-up visit. Patient was accompanied by her father Jonn. Today's visit is a follow-up MTM visit from 1/11/22 with Dr. Chen     Reason for visit: MTM.    Allergies/ADRs: Reviewed in chart  Past Medical History: Reviewed in chart  Social History     Tobacco Use     Smoking status: Never Smoker     Smokeless  "tobacco: Never Used   Substance Use Topics     Alcohol use: Not on file     Drug use: Not on file   ^Reviewed today    Medication Adherence/Access: Per patient, misses medication 0 times per week. Father sets up pill box for her.      Type 2 Diabetes:  Currently taking metformin XR 2000 mg, Novolog 14 units TID, and Lantus 64 units every morning. Patient is not experiencing side effects. Also taking cinnamon OTC . Patient is not experiencing side effects.  Blood sugar monitoring: Continuous Glucose Monitor. Ranges (based on glucometer readings): See below. Sensor fell off after 4 days of wearing while she was sleeping.    Symptoms of low blood sugar? none  Symptoms of high blood sugar? none  Diet/Exercise: Not addressed this visit due to time  Aspirin: Not taking due to age  The ASCVD Risk score (Mena BAZZI Jr., et al., 2013) failed to calculate for the following reasons:    The 2013 ASCVD risk score is only valid for ages 40 to 79  Statin: Yes: Atorvastatin 80 mg daily   ACEi/ARB: Yes: Lisinopril 5 mg daily.   Urine Albumin: No results found for: UMALCR   Lab Results   Component Value Date    A1C 7.9 12/22/2021    A1C 5.9 04/15/2003    A1C 6.1 03/05/2003     Most Recent Immunizations   Administered Date(s) Administered     COVID-19,PF,Pfizer (12+ Yrs) 12/22/2021     DT (PEDS <7y) 07/24/1997     Hep B, Peds or Adolescent 1992     HepB 12/22/1993     HepB, Unspecified 12/22/1993     Hib (PRP-T) 11/15/1994     Hib, Unspecified 11/15/1994     Historical DTP/aP 07/24/1997     Influenza (IIV3) PF 02/15/2005     MMR 08/28/2000     OPV, trivalent, live 07/24/1997     Polio, Unspecified  07/24/1997     Td (Adult), Adsorbed 03/30/2004     Varicella 02/15/2005        BP Readings from Last 1 Encounters:   02/01/22 (!) 136/94     Pulse Readings from Last 1 Encounters:   02/01/22 110     Wt Readings from Last 1 Encounters:   02/01/22 292 lb (132.5 kg)     Ht Readings from Last 1 Encounters:   12/22/21 5' 6\" (1.676 m) " "    Estimated body mass index is 47.13 kg/m  as calculated from the following:    Height as of 12/22/21: 5' 6\" (1.676 m).    Weight as of 2/1/22: 292 lb (132.5 kg).    Temp Readings from Last 1 Encounters:   02/01/22 98.3  F (36.8  C) (Oral)     Unable to obtain vitals today due to staff availability/time.  ----------------  I spent 30 minutes with this patient today. Dr. Taqueria Saunders was provided the recommendations above via routed note and Dr. Cisneros is the authorizing prescriber for this visit through the pharmacist collaborative practice agreement.     The patient was given a summary of these recommendations.     Miles Chen, PharmD, [facility]  PGY1 Ambulatory Care Pharmacy Resident     Medication Therapy Recommendations  Diabetes mellitus, type 2 (H)    Current Medication: exenatide ER (BYDUREON BCISE) 2 MG/0.85ML auto-injector   Rationale: Synergistic therapy - Needs additional medication therapy - Indication   Recommendation: Start Medication - BYDUREON BCISE SC   Status: Accepted per CPA          Current Medication: exenatide ER (BYDUREON BCISE) 2 MG/0.85ML auto-injector   Rationale: Does not understand instructions - Adherence - Adherence   Recommendation: Provide Education   Status: Patient Agreed - Adherence/Education          Current Medication: insulin aspart (NOVOLOG PEN) 100 UNIT/ML pen   Rationale: Dose too high - Dosage too high - Safety   Recommendation: Decrease Dose   Status: Accepted per CPA          Current Medication: insulin glargine (LANTUS PEN) 100 UNIT/ML pen   Rationale: Dose too high - Dosage too high - Safety   Recommendation: Decrease Dose   Status: Accepted per CPA                 "

## 2022-02-01 ENCOUNTER — OFFICE VISIT (OUTPATIENT)
Dept: PHARMACY | Facility: CLINIC | Age: 32
End: 2022-02-01
Payer: MEDICAID

## 2022-02-01 ENCOUNTER — OFFICE VISIT (OUTPATIENT)
Dept: FAMILY MEDICINE | Facility: CLINIC | Age: 32
End: 2022-02-01
Payer: MEDICAID

## 2022-02-01 VITALS
BODY MASS INDEX: 47.13 KG/M2 | TEMPERATURE: 98.3 F | WEIGHT: 292 LBS | HEART RATE: 110 BPM | SYSTOLIC BLOOD PRESSURE: 136 MMHG | DIASTOLIC BLOOD PRESSURE: 94 MMHG | OXYGEN SATURATION: 98 % | RESPIRATION RATE: 18 BRPM

## 2022-02-01 DIAGNOSIS — Z79.4 TYPE 2 DIABETES MELLITUS WITHOUT COMPLICATION, WITH LONG-TERM CURRENT USE OF INSULIN (H): ICD-10-CM

## 2022-02-01 DIAGNOSIS — E11.9 TYPE 2 DIABETES MELLITUS WITHOUT COMPLICATION, WITH LONG-TERM CURRENT USE OF INSULIN (H): Primary | ICD-10-CM

## 2022-02-01 DIAGNOSIS — F99 MENTAL HEALTH DISORDER: ICD-10-CM

## 2022-02-01 DIAGNOSIS — Z12.4 CERVICAL CANCER SCREENING: Primary | ICD-10-CM

## 2022-02-01 DIAGNOSIS — Z79.4 TYPE 2 DIABETES MELLITUS WITHOUT COMPLICATION, WITH LONG-TERM CURRENT USE OF INSULIN (H): Primary | ICD-10-CM

## 2022-02-01 DIAGNOSIS — E11.9 TYPE 2 DIABETES MELLITUS WITHOUT COMPLICATION, WITH LONG-TERM CURRENT USE OF INSULIN (H): ICD-10-CM

## 2022-02-01 PROCEDURE — 99213 OFFICE O/P EST LOW 20 MIN: CPT | Mod: GC | Performed by: FAMILY MEDICINE

## 2022-02-01 PROCEDURE — 99606 MTMS BY PHARM EST 15 MIN: CPT | Performed by: PHARMACIST

## 2022-02-01 PROCEDURE — 99607 MTMS BY PHARM ADDL 15 MIN: CPT | Performed by: PHARMACIST

## 2022-02-01 RX ORDER — EXENATIDE 2 MG/.85ML
2 INJECTION, SUSPENSION, EXTENDED RELEASE SUBCUTANEOUS
Qty: 3.4 ML | Refills: 11 | Status: SHIPPED | OUTPATIENT
Start: 2022-02-01 | End: 2023-06-08

## 2022-02-01 NOTE — PATIENT INSTRUCTIONS
- START Bydureon 2mg once weekly  - DECREASE Lantus to 56 units daily  - DECREASE Novolog 12 units 3 times daily before meals    If you forget a dose of Bydureon:  - If it is 3 or more days to the time of your next dose, take the shot and go back to the normal day  - If it is 1 or 2 days to the time of your next dose, then skip that dose and go back to the normal day      SInce the Freestyle Mark sensor fell off, call [phone]. Follow the prompts to the main menu and select option 2 (Technical Product Support, Training, and Replacements). Ask for a replacement.
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
## 2022-02-09 NOTE — PROGRESS NOTES
Preceptor Attestation:    I discussed the patient with the resident and evaluated the patient in person. I have verified the content of the note, which accurately reflects my assessment of the patient and the plan of care.   Supervising Physician:  Pepe Johnston MD.

## 2022-02-18 ENCOUNTER — OFFICE VISIT (OUTPATIENT)
Dept: PHARMACY | Facility: CLINIC | Age: 32
End: 2022-02-18
Payer: MEDICAID

## 2022-02-18 ENCOUNTER — OFFICE VISIT (OUTPATIENT)
Dept: FAMILY MEDICINE | Facility: CLINIC | Age: 32
End: 2022-02-18
Payer: MEDICAID

## 2022-02-18 VITALS
HEART RATE: 112 BPM | BODY MASS INDEX: 47.29 KG/M2 | WEIGHT: 293 LBS | SYSTOLIC BLOOD PRESSURE: 128 MMHG | TEMPERATURE: 98.1 F | RESPIRATION RATE: 16 BRPM | OXYGEN SATURATION: 94 % | DIASTOLIC BLOOD PRESSURE: 83 MMHG

## 2022-02-18 DIAGNOSIS — Z79.4 TYPE 2 DIABETES MELLITUS WITHOUT COMPLICATION, WITH LONG-TERM CURRENT USE OF INSULIN (H): Primary | ICD-10-CM

## 2022-02-18 DIAGNOSIS — E11.9 TYPE 2 DIABETES MELLITUS WITHOUT COMPLICATION, WITH LONG-TERM CURRENT USE OF INSULIN (H): Primary | ICD-10-CM

## 2022-02-18 DIAGNOSIS — E66.01 MORBID OBESITY (H): ICD-10-CM

## 2022-02-18 DIAGNOSIS — L74.8 FOOT ODOR: ICD-10-CM

## 2022-02-18 PROCEDURE — 99214 OFFICE O/P EST MOD 30 MIN: CPT | Mod: GC | Performed by: FAMILY MEDICINE

## 2022-02-18 PROCEDURE — 99606 MTMS BY PHARM EST 15 MIN: CPT | Performed by: PHARMACIST

## 2022-02-18 PROCEDURE — 99607 MTMS BY PHARM ADDL 15 MIN: CPT | Performed by: PHARMACIST

## 2022-02-18 RX ORDER — PROCHLORPERAZINE 25 MG/1
1 SUPPOSITORY RECTAL
Qty: 1 EACH | Refills: 1 | Status: SHIPPED | OUTPATIENT
Start: 2022-02-18 | End: 2022-11-23

## 2022-02-18 RX ORDER — PROCHLORPERAZINE 25 MG/1
1 SUPPOSITORY RECTAL ONCE
Qty: 1 EACH | Refills: 0 | Status: SHIPPED | OUTPATIENT
Start: 2022-02-18 | End: 2022-02-18

## 2022-02-18 RX ORDER — PRENATAL VIT 91/IRON/FOLIC/DHA 28-975-200
COMBINATION PACKAGE (EA) ORAL 2 TIMES DAILY
Qty: 24 G | Refills: 1 | Status: SHIPPED | OUTPATIENT
Start: 2022-02-18

## 2022-02-18 RX ORDER — PROCHLORPERAZINE 25 MG/1
1 SUPPOSITORY RECTAL
Qty: 3 EACH | Refills: 5 | Status: SHIPPED | OUTPATIENT
Start: 2022-02-18 | End: 2022-11-23

## 2022-02-18 NOTE — PROGRESS NOTES
[facility]    Assessment/Plan:    Type 2 diabetes mellitus without complication, with long-term current use of insulin (H)  Freestyle page frequently falling off arm/not sensing blood sugars. Will change to DexCom system.  Also starting bydureon weekly- they wanted one more review of how to do the bydureon and to review the plan for going down on insulin.  Did teach back- they are comfortable with the plan to decrease lantus and novolog (they haven't started yet).  Also had questions about doing toenail clippings; had been doing them with podiatry in the past. Both patient and dad are comfortable with doing it themselves and nails are not dystrophic, no lesions- discussed that I am comfortable with them doing toenail clippings themselves if they are.   - Continuous Blood Gluc  (DEXCOM G6 ) PHILIPPE  Dispense: 1 each; Refill: 0  - Continuous Blood Gluc Transmit (DEXCOM G6 TRANSMITTER) MISC  Dispense: 1 each; Refill: 1  - Continuous Blood Gluc Sensor (DEXCOM G6 SENSOR) MISC  Dispense: 3 each; Refill: 5    Foot odor  Discussed foot hygiene.   - terbinafine (LAMISIL) 1 % external cream  Dispense: 24 g; Refill: 1          Adri Saunders MD  PGY3, Family Medicine    I staffed with Dr. Cisneros, who agrees with my assessment and plan.    Prescription drug management       Apple Holliday is a 31 year old female with a PMH of   Patient Active Problem List   Diagnosis     Morbid obesity (H)     Diabetes mellitus, type 2 (H)     Delay in development     presenting to clinic today with a chief complaint of:    Patient presents with:  Recheck Medication: needs help with DM sensors. Clarify insulin      Her freestyle page sensors keep falling off or don't read when placed. They are nervous about the bydureon and would like to review one more time how to do the injection. They haven't started the decreased insulin dosage yet.     She has been having smelly feet for a long time. No  itching, red areas.     Current Outpatient Medications   Medication Sig Dispense Refill     Continuous Blood Gluc  (DEXCOM G6 ) PHILIPPE 1 each once for 1 dose Use to read blood sugars as per 's instructions. 1 each 0     Continuous Blood Gluc Sensor (DEXCOM G6 SENSOR) MISC 1 each every 10 days Change every 10 days. 3 each 5     Continuous Blood Gluc Transmit (DEXCOM G6 TRANSMITTER) MISC 1 each every 3 months Change every 3 months. 1 each 1     terbinafine (LAMISIL) 1 % external cream Apply topically 2 times daily In between toes 24 g 1     acetaminophen (TYLENOL) 325 MG tablet Take 325-650 mg by mouth every 6 hours as needed for mild pain       atorvastatin (LIPITOR) 80 MG tablet Take 1 tablet (80 mg) by mouth daily 90 tablet 3     blood glucose (NO BRAND SPECIFIED) lancets standard Use to test blood sugar 4 times daily or as directed. 240 each 1     blood glucose (NO BRAND SPECIFIED) test strip Use to test blood sugar 4 times daily or as directed. 300 strip 1     blood glucose monitoring (NO BRAND SPECIFIED) meter device kit Use to test blood sugar 4 times daily or as directed. 1 kit 0     calcium carbonate (TUMS) 500 MG chewable tablet Take 1 chew tab by mouth 2 times daily       calcium polycarbophil (FIBERCON) 625 MG tablet Take 1 tablet (625 mg) by mouth daily 90 tablet 1     cinnamon 500 MG CAPS Take 500 mg by mouth daily       Continuous Blood Gluc Sensor (FREESTYLE CESAR 2 SENSOR) MISC 1 each every 14 days 1 each every 14 days. Change every 14 days. 6 each 3     divalproex sodium extended-release (DEPAKOTE ER) 500 MG 24 hr tablet Take 3 tablets (1,500 mg) by mouth At Bedtime 90 tablet 3     exenatide ER (BYDUREON BCISE) 2 MG/0.85ML auto-injector Inject 2 mg Subcutaneous every 7 days 3.4 mL 11     folic acid (FOLVITE) 1 MG tablet Take 1 tablet (1 mg) by mouth daily 90 tablet 1     hydrOXYzine (ATARAX) 50 MG tablet Take 1 tablet (50 mg) by mouth At Bedtime 30 tablet 3     ibuprofen  (ADVIL/MOTRIN) 200 MG tablet Take 200 mg by mouth every 4 hours as needed for mild pain       insulin aspart (NOVOLOG PEN) 100 UNIT/ML pen Inject 12 Units Subcutaneous 3 times daily (with meals) 15 mL 1     insulin glargine (LANTUS PEN) 100 UNIT/ML pen Inject 56 Units Subcutaneous every morning 15 mL 1     insulin pen needle (32G X 4 MM) 32G X 4 MM miscellaneous Use 4 pen needles daily or as directed. 100 each 11     levothyroxine (SYNTHROID/LEVOTHROID) 200 MCG tablet Take 1 tablet (200 mcg) by mouth daily 90 tablet 1     levothyroxine (SYNTHROID/LEVOTHROID) 50 MCG tablet Take 1 tablet (50 mcg) by mouth daily 60 tablet 0     lisinopril (ZESTRIL) 2.5 MG tablet Take 2 tablets (5 mg) by mouth daily 30 tablet 3     medroxyPROGESTERone (DEPO-PROVERA) 150 MG/ML IM injection Inject 150 mg into the muscle every 3 months       metFORMIN (GLUCOPHAGE-XR) 500 MG 24 hr tablet Take 4 tablets (2,000 mg) by mouth daily (with dinner) 120 tablet 3     pantoprazole (PROTONIX) 40 MG EC tablet Take 1 tablet (40 mg) by mouth daily 30 tablet 1     risperiDONE (RISPERDAL) 2 MG tablet Take 1 tablet (2 mg) by mouth daily 60 tablet 1       O: /83   Pulse 112   Temp 98.1  F (36.7  C)   Resp 16   Wt 132.9 kg (293 lb)   SpO2 94%   BMI 47.29 kg/m     Gen:  Well nourished and in no acute distress   HEENT: PERRL;  nasopharynx pink and moist; oropharynx pink and moist  Feet: feet sweating present with mild odor. No rashes or lesions. Nails normal and well trimmed.   Psych: Euthymic     This note was created using Dragon dictation system. Typos are not purposeful.

## 2022-02-18 NOTE — PATIENT INSTRUCTIONS
START Bydureon 2mg once weekly (educated her on proper technique; see below)  - DECREASE Lantus to 56 units daily  - DECREASE Novolog 12 units 3 times daily before meals     If you forget a dose of Bydureon:  - If it is 3 or more days to the time of your next dose, take the shot and go back to the normal day  - If it is 1 or 2 days to the time of your next dose, then skip that dose and go back to the normal day     SInce the Freestyle Mark sensor fell off, call [phone]. Follow the prompts to the main menu and select option 2 (Technical Product Support, Training, and Replacements). Ask for a replacement.     Bydureon BCise Education:     1. Instructed patient to store pens in the refrigerator and to take 1 pen out 15 minutes before use to allow it to come to room temperature.     2. Instructed patient on proper hygiene prior to administration including handwashing and cleaning administration site with alcohol swab.     3. Instructed patient to shake pen vigorously 15 times and how to check to ensure proper mixing.  Patient Education     Diabetes: Keeping Feet Healthy   Diabetes can damage nerves in your feet and cause weakness, numbness, and pain (neuropathy). This makes it hard to feel injuries or sore spots. Diabetes can also change blood flow. This can make it harder for small problems, like a blister, to heal. In fact, small injuries can quickly become serious infections that send you to the hospital. Practice self-care to protect your feet and keep them healthy.    Take special care   These tips can help you care for your feet:    Check your feet daily for problems such as swelling, redness, and blisters. Also looks for cracks, dry skin, or numbness. Use a mirror to see the bottoms of your feet. Or, ask for help. Try to check your feet at the same time every day.    Manage your diabetes. Check and control your blood sugar. Take all your medicines as prescribed. Call your provider if you have trouble  controlling your blood sugar.    Don't walk barefoot, even indoors. Always wear socks inside your shoes.     Wash your feet with warm water and mild soap. Check the water temperature before putting your foot in the water. Dry well, especially between toes.    Don t treat in-grown toenails, corns, or calluses yourself. Talk with your healthcare provider or foot doctor (podiatrist) if you need help trimming your toenails. Ask your podiatrist for a foot care plan.    Use moisturizing cream or lotion if you have dry skin. But, don t use it between toes.    Don t use heating pads on your feet. If you have nerve damage (neuropathy), you could get a burn and not feel it.    Stop smoking. Smoking limits blood flow. It can make it harder for wounds to heal.    Don't use sharp blades to trim your nails. Use a nail clipper and file instead.   Have regular checkups   Foot problems can develop fast. So be sure to follow your healthcare team s schedule for checkups. During office visits, take off your shoes and socks as soon as you get in the exam room. Ask your provider to check your feet for problems. This will make it easier to find and treat small skin irritations before they get worse. Regular checkups can also help keep track of the blood flow and feeling in your feet. You may need to have checkups more often if you have neuropathy.      Have your feet checked every time you see your healthcare provider, and at least once a year.     Wear correct footwear   Wearing correct footwear is very important. If areas of your feet have been damaged by too much pressure, your healthcare provider may advise changing your footwear. You may need to not wear high heels or tight work boots. Or, your healthcare provider may advise special shoes or inserts. These help protect your feet and keep existing problems from getting worse. If you need special footwear, ask your healthcare provider if you qualify for Medicare's custom-molded and  extra-depth diabetic shoe and insert program.    Make sure shoes and socks fit   Any pair of shoes--new or old--should feel comfortable as soon as you put them on. There shouldn t be any rubbing when you walk. Wear the right shoe for any activity. For instance, a running shoe is meant to keep your feet free from injury while you jog. Buy shoes at the end of the day, when your feet are larger. Check that they provide support without feeling too loose. Check that your socks fit, too. Wear soft, seamless, well-padded socks for activity. Cotton or microfiber socks are best to help to absorb sweat. To protect your feet, don't wear open-toed or open-heeled shoes. Talk with your healthcare team if you have questions about what kinds of shoes and socks are best   .  Get regular exercise   Regular exercise helps blood flow in your feet. It also helps make your feet stronger and more flexible. Gentle exercises such as walking or riding a stationary bike are best. You can also do special foot exercises. Talk with your healthcare provider before starting any exercise program. Also tell your provider if any exercise causes pain, redness, or other foot problems.      Note: If you have any kind of break in the skin of your foot or ankle, keep the area clean. Then call your healthcare provider. This is especially true if the area doesn t seem to be healing.   Jamin last reviewed this educational content on 11/1/2019 2000-2021 [employer]. All rights reserved. This information is not intended as a substitute for professional medical care. Always follow your healthcare professional's instructions.

## 2022-02-18 NOTE — PROGRESS NOTES
Preceptor Attestation:    I discussed the patient with the resident and evaluated the patient in person. I have verified the content of the note, which accurately reflects my assessment of the patient and the plan of care.   Supervising Physician:  Zac Cisneros MD.

## 2022-02-20 NOTE — PROGRESS NOTES
I have verified the content of the note, which accurately reflects my assessment of the patient and the plan of care.   Selina Nolasco, [facility], PharmD

## 2022-02-20 NOTE — PROGRESS NOTES
Medication Therapy Management (MTM) Encounter    ASSESSMENT:                            Medication Adherence/Access: See below for considerations    Type 2 Diabetes: Near A1C goal of < 7%, but unable to completely assess given lack of CGM or SMBG data. Patient has had continued issues with the Mark sensor falling off despite using adhesives to try to keep it in place. Therefore, would benefit from swapping CGM to Dexcom, as she wishes to try wearing a CGM on her abdomen. Additionally, patient did not feel comfortable administering Bydureon over the past few weeks--would benefit on education and reassurance today, along with the subsequent insulin reductions made by Dr. Nolasco on 2/1/22.     PLAN:                            1. Prescribed Dexcom G6 system. Patient will make appointment for next week for education.     2. Re-educated patient on how to administer Bydureon as outlined in the note from Dr. Nolasco on 2/1/22. Patient is ready to start today.     3. After today: Decrease Lantus to 56 units daily and decrease Novolog to 12 units three times daily before meals     Follow-up: In about 1 week with Dr. Chen for Dexcom Education    Medication issues to be addressed at a future visit:      Assess tolerance of Bydureon/insulin adjustments    Still unclear why patient is on atorvastatin given age. Was LDL > 190 at one time?    Reassess GERD w/ pantoprazole and consider discontinuing or swapping to H2RA.     SUBJECTIVE/OBJECTIVE:                          Apple Holliday is a 31 year old female called for a follow-up visit. Patient was accompanied by her father Jonn. Today is a co-visit with Dr. Meng. Today's visit is a follow-up MTM visit from 2/1/22 with Dr. Nolasco.     Reason for visit: MTM.    Allergies/ADRs: Reviewed in chart  Past Medical History: Reviewed in chart  Social History     Tobacco Use     Smoking status: Never Smoker     Smokeless tobacco: Never Used   Substance Use Topics     Alcohol use: Not  on file     Drug use: Not on file   ^Reviewed today    Medication Adherence/Access: Per patient, misses medication 0 times per week. Father sets up pill box for her.      Type 2 Diabetes:  Currently taking metformin XR 2000 mg, Novolog 14 units TID before meals, and Lantus 64 units every morning. Patient is not experiencing side effects. Also taking cinnamon OTC . Patient is not experiencing side effects.  Blood sugar monitoring: None--the sensor fell off again after a few days--no useful data to download today. Provided patient with a few adhesives to help the sensor stick, but the sensors continue to fall off. Patient interested in trying to wear sensor on belly. Has not been monitoring SMBG as patient does not wish to do it.  Symptoms of low blood sugar? None  Symptoms of high blood sugar? None  Diet/Exercise: Eager to start Bydureon to help curb her appetite.   Aspirin: Not taking due to age  The ASCVD Risk score (Mena JLUIS Jr., et al., 2013) failed to calculate for the following reasons:    The 2013 ASCVD risk score is only valid for ages 40 to 79  Statin: Yes: Atorvastatin 80 mg daily   ACEi/ARB: Yes: Lisinopril 5 mg daily.   Urine Albumin: No results found for: UMALCR   Lab Results   Component Value Date    A1C 7.9 12/22/2021    A1C 5.9 04/15/2003    A1C 6.1 03/05/2003     Most Recent Immunizations   Administered Date(s) Administered     COVID-19,PF,Pfizer (12+ Yrs) 12/22/2021     DT (PEDS <7y) 07/24/1997     Hep B, Peds or Adolescent 1992     HepB 12/22/1993     HepB, Unspecified 12/22/1993     Hib (PRP-T) 11/15/1994     Hib, Unspecified 11/15/1994     Historical DTP/aP 07/24/1997     Influenza (IIV3) PF 02/15/2005     MMR 08/28/2000     OPV, trivalent, live 07/24/1997     Polio, Unspecified  07/24/1997     Td (Adult), Adsorbed 03/30/2004     Varicella 02/15/2005        BP Readings from Last 1 Encounters:   02/18/22 128/83     Pulse Readings from Last 1 Encounters:   02/18/22 112     Wt Readings from  "Last 1 Encounters:   02/18/22 293 lb (132.9 kg)     Ht Readings from Last 1 Encounters:   12/22/21 5' 6\" (1.676 m)     Estimated body mass index is 47.29 kg/m  as calculated from the following:    Height as of 12/22/21: 5' 6\" (1.676 m).    Weight as of an earlier encounter on 2/18/22: 293 lb (132.9 kg).    Temp Readings from Last 1 Encounters:   02/18/22 98.1  F (36.7  C)     ----------------  I spent 15 minutes with this patient today. Dr. Taqueria Saunders was provided the recommendations above in clinic today note and Dr. Cisnreos is the authorizing prescriber for this visit through the pharmacist collaborative practice agreement. A copy of this visit note was not provided to the patient's PCP, as changes in clinic were discussed today.     The patient was given a summary of these recommendations.     Miles Chen, PharmD, [facility]  PGY1 Ambulatory Care Pharmacy Resident     Medication Therapy Recommendations  Diabetes mellitus, type 2 (H)    Current Medication: Continuous Blood Gluc Sensor (FREESTYLE CESAR 2 SENSOR) MISC   Rationale: Patient prefers not to take - Adherence - Adherence   Recommendation: Change Medication - Dexcom G6  Ammy   Status: Accepted per Provider               "

## 2022-03-14 DIAGNOSIS — E03.9 HYPOTHYROIDISM, UNSPECIFIED TYPE: ICD-10-CM

## 2022-03-15 RX ORDER — LEVOTHYROXINE SODIUM 50 UG/1
50 TABLET ORAL DAILY
Qty: 60 TABLET | Refills: 0 | Status: SHIPPED | OUTPATIENT
Start: 2022-03-15 | End: 2022-05-27

## 2022-03-21 ENCOUNTER — TELEPHONE (OUTPATIENT)
Dept: FAMILY MEDICINE | Facility: CLINIC | Age: 32
End: 2022-03-21
Payer: COMMERCIAL

## 2022-03-21 NOTE — TELEPHONE ENCOUNTER
[facility] phone call message- general phone call:    Reason for call: He needs a call back from a Pharm D re her medications.    Action desired: call back.    Return call needed: Yes    OK to leave a message on voice mail? Yes    Advised patient to response may take up to 2 business days: Yes    Primary language: English      needed? No    Call taken on March 21, 2022 at 2:49 PM by Osvaldo Jerome

## 2022-03-24 DIAGNOSIS — K21.9 GASTROESOPHAGEAL REFLUX DISEASE, UNSPECIFIED WHETHER ESOPHAGITIS PRESENT: ICD-10-CM

## 2022-03-25 RX ORDER — PANTOPRAZOLE SODIUM 40 MG/1
TABLET, DELAYED RELEASE ORAL
Qty: 30 TABLET | Refills: 1 | Status: SHIPPED | OUTPATIENT
Start: 2022-03-25 | End: 2022-05-04

## 2022-03-31 DIAGNOSIS — Z79.4 TYPE 2 DIABETES MELLITUS WITHOUT COMPLICATION, WITH LONG-TERM CURRENT USE OF INSULIN (H): ICD-10-CM

## 2022-03-31 DIAGNOSIS — E11.9 TYPE 2 DIABETES MELLITUS WITHOUT COMPLICATION, WITH LONG-TERM CURRENT USE OF INSULIN (H): ICD-10-CM

## 2022-04-01 RX ORDER — INSULIN GLARGINE 100 [IU]/ML
INJECTION, SOLUTION SUBCUTANEOUS
Qty: 15 ML | Refills: 1 | Status: SHIPPED | OUTPATIENT
Start: 2022-04-01 | End: 2022-05-04

## 2022-04-04 NOTE — TELEPHONE ENCOUNTER
I was out for the past 2 weeks so did not get this message until today.  I called back and left a message to call back if they still had questions about medications.    Claudy BrianD.      4/4/22, 3:50pm  Called back again and spoke with Jonn. He doesn't remember what the medication question was but he was not at home but he will look at his notes when he gets home and will call back if he still has a question.    Selina Nolasco Pharm.D.

## 2022-04-25 DIAGNOSIS — F89 DEVELOPMENTAL DISORDER: ICD-10-CM

## 2022-04-25 DIAGNOSIS — Z79.4 TYPE 2 DIABETES MELLITUS WITHOUT COMPLICATION, WITH LONG-TERM CURRENT USE OF INSULIN (H): ICD-10-CM

## 2022-04-25 DIAGNOSIS — E11.9 TYPE 2 DIABETES MELLITUS WITHOUT COMPLICATION, WITH LONG-TERM CURRENT USE OF INSULIN (H): ICD-10-CM

## 2022-04-26 RX ORDER — METFORMIN HCL 500 MG
2000 TABLET, EXTENDED RELEASE 24 HR ORAL
Qty: 120 TABLET | Refills: 3 | Status: SHIPPED | OUTPATIENT
Start: 2022-04-26 | End: 2022-05-04

## 2022-04-26 RX ORDER — HYDROXYZINE HYDROCHLORIDE 50 MG/1
50 TABLET, FILM COATED ORAL AT BEDTIME
Qty: 30 TABLET | Refills: 3 | Status: SHIPPED | OUTPATIENT
Start: 2022-04-26 | End: 2022-11-04

## 2022-05-04 ENCOUNTER — OFFICE VISIT (OUTPATIENT)
Dept: FAMILY MEDICINE | Facility: CLINIC | Age: 32
End: 2022-05-04
Payer: COMMERCIAL

## 2022-05-04 ENCOUNTER — TELEPHONE (OUTPATIENT)
Dept: FAMILY MEDICINE | Facility: CLINIC | Age: 32
End: 2022-05-04

## 2022-05-04 ENCOUNTER — OFFICE VISIT (OUTPATIENT)
Dept: PHARMACY | Facility: CLINIC | Age: 32
End: 2022-05-04
Payer: COMMERCIAL

## 2022-05-04 VITALS
DIASTOLIC BLOOD PRESSURE: 83 MMHG | BODY MASS INDEX: 47.09 KG/M2 | HEIGHT: 66 IN | RESPIRATION RATE: 20 BRPM | WEIGHT: 293 LBS | OXYGEN SATURATION: 97 % | HEART RATE: 110 BPM | TEMPERATURE: 98.4 F | SYSTOLIC BLOOD PRESSURE: 136 MMHG

## 2022-05-04 DIAGNOSIS — K21.9 GASTROESOPHAGEAL REFLUX DISEASE, UNSPECIFIED WHETHER ESOPHAGITIS PRESENT: ICD-10-CM

## 2022-05-04 DIAGNOSIS — E78.2 MIXED HYPERLIPIDEMIA: ICD-10-CM

## 2022-05-04 DIAGNOSIS — E03.9 HYPOTHYROIDISM, UNSPECIFIED TYPE: ICD-10-CM

## 2022-05-04 DIAGNOSIS — G47.00 INSOMNIA: ICD-10-CM

## 2022-05-04 DIAGNOSIS — K59.00 CONSTIPATION, UNSPECIFIED CONSTIPATION TYPE: ICD-10-CM

## 2022-05-04 DIAGNOSIS — Z79.4 TYPE 2 DIABETES MELLITUS WITHOUT COMPLICATION, WITH LONG-TERM CURRENT USE OF INSULIN (H): Primary | ICD-10-CM

## 2022-05-04 DIAGNOSIS — R22.43 LOCALIZED SWELLING OF BOTH LOWER LEGS: ICD-10-CM

## 2022-05-04 DIAGNOSIS — E11.9 TYPE 2 DIABETES MELLITUS WITHOUT COMPLICATION, WITH LONG-TERM CURRENT USE OF INSULIN (H): Primary | ICD-10-CM

## 2022-05-04 DIAGNOSIS — N89.8 VAGINAL DISCHARGE: Primary | ICD-10-CM

## 2022-05-04 DIAGNOSIS — F99 MENTAL HEALTH DISORDER: ICD-10-CM

## 2022-05-04 DIAGNOSIS — G47.00 INSOMNIA, UNSPECIFIED TYPE: ICD-10-CM

## 2022-05-04 LAB
ALBUMIN UR-MCNC: NEGATIVE MG/DL
ANION GAP SERPL CALCULATED.3IONS-SCNC: 14 MMOL/L (ref 5–18)
APPEARANCE UR: CLEAR
BILIRUB UR QL STRIP: NEGATIVE
BUN SERPL-MCNC: 8 MG/DL (ref 8–22)
CALCIUM SERPL-MCNC: 9.7 MG/DL (ref 8.5–10.5)
CHLORIDE BLD-SCNC: 103 MMOL/L (ref 98–107)
CLUE CELLS: NORMAL
CO2 SERPL-SCNC: 23 MMOL/L (ref 22–31)
COLOR UR AUTO: YELLOW
CREAT SERPL-MCNC: 0.74 MG/DL (ref 0.6–1.1)
CREAT UR-MCNC: 92 MG/DL
GFR SERPL CREATININE-BSD FRML MDRD: >90 ML/MIN/1.73M2
GLUCOSE BLD-MCNC: 85 MG/DL (ref 70–125)
GLUCOSE UR STRIP-MCNC: NEGATIVE MG/DL
HGB UR QL STRIP: NEGATIVE
KETONES UR STRIP-MCNC: 15 MG/DL
LEUKOCYTE ESTERASE UR QL STRIP: NEGATIVE
MICROALBUMIN UR-MCNC: 1.95 MG/DL (ref 0–1.99)
MICROALBUMIN/CREAT UR: 21.2 MG/G CR
NITRATE UR QL: NEGATIVE
PH UR STRIP: 6 [PH] (ref 5–8)
POTASSIUM BLD-SCNC: 4.7 MMOL/L (ref 3.5–5)
SODIUM SERPL-SCNC: 140 MMOL/L (ref 136–145)
SP GR UR STRIP: 1.02 (ref 1–1.03)
T4 FREE SERPL-MCNC: 0.85 NG/DL (ref 0.7–1.8)
TRICHOMONAS, WET PREP: NORMAL
TSH SERPL DL<=0.005 MIU/L-ACNC: 21.24 UIU/ML (ref 0.3–5)
UROBILINOGEN UR STRIP-ACNC: 0.2 E.U./DL
WBC'S/HIGH POWER FIELD, WET PREP: NORMAL
YEAST, WET PREP: NORMAL

## 2022-05-04 PROCEDURE — 82043 UR ALBUMIN QUANTITATIVE: CPT | Performed by: STUDENT IN AN ORGANIZED HEALTH CARE EDUCATION/TRAINING PROGRAM

## 2022-05-04 PROCEDURE — 99605 MTMS BY PHARM NP 15 MIN: CPT

## 2022-05-04 PROCEDURE — 36415 COLL VENOUS BLD VENIPUNCTURE: CPT | Performed by: STUDENT IN AN ORGANIZED HEALTH CARE EDUCATION/TRAINING PROGRAM

## 2022-05-04 PROCEDURE — 87210 SMEAR WET MOUNT SALINE/INK: CPT | Performed by: STUDENT IN AN ORGANIZED HEALTH CARE EDUCATION/TRAINING PROGRAM

## 2022-05-04 PROCEDURE — 80048 BASIC METABOLIC PNL TOTAL CA: CPT | Performed by: STUDENT IN AN ORGANIZED HEALTH CARE EDUCATION/TRAINING PROGRAM

## 2022-05-04 PROCEDURE — 81003 URINALYSIS AUTO W/O SCOPE: CPT | Performed by: STUDENT IN AN ORGANIZED HEALTH CARE EDUCATION/TRAINING PROGRAM

## 2022-05-04 PROCEDURE — 99607 MTMS BY PHARM ADDL 15 MIN: CPT

## 2022-05-04 PROCEDURE — 84439 ASSAY OF FREE THYROXINE: CPT | Performed by: STUDENT IN AN ORGANIZED HEALTH CARE EDUCATION/TRAINING PROGRAM

## 2022-05-04 PROCEDURE — 83880 ASSAY OF NATRIURETIC PEPTIDE: CPT | Performed by: STUDENT IN AN ORGANIZED HEALTH CARE EDUCATION/TRAINING PROGRAM

## 2022-05-04 PROCEDURE — 87086 URINE CULTURE/COLONY COUNT: CPT | Performed by: STUDENT IN AN ORGANIZED HEALTH CARE EDUCATION/TRAINING PROGRAM

## 2022-05-04 PROCEDURE — 84443 ASSAY THYROID STIM HORMONE: CPT | Performed by: STUDENT IN AN ORGANIZED HEALTH CARE EDUCATION/TRAINING PROGRAM

## 2022-05-04 PROCEDURE — 99204 OFFICE O/P NEW MOD 45 MIN: CPT | Mod: GC | Performed by: STUDENT IN AN ORGANIZED HEALTH CARE EDUCATION/TRAINING PROGRAM

## 2022-05-04 RX ORDER — RISPERIDONE 2 MG/1
2 TABLET ORAL DAILY
Qty: 60 TABLET | Refills: 1 | Status: SHIPPED | OUTPATIENT
Start: 2022-05-04

## 2022-05-04 RX ORDER — INSULIN GLARGINE 100 [IU]/ML
54 INJECTION, SOLUTION SUBCUTANEOUS EVERY MORNING
Qty: 60 ML | Refills: 3 | Status: SHIPPED | OUTPATIENT
Start: 2022-05-04 | End: 2023-06-08

## 2022-05-04 RX ORDER — PANTOPRAZOLE SODIUM 40 MG/1
40 TABLET, DELAYED RELEASE ORAL DAILY
Qty: 30 TABLET | Refills: 1 | Status: SHIPPED | OUTPATIENT
Start: 2022-05-04 | End: 2022-09-23

## 2022-05-04 RX ORDER — METFORMIN HCL 500 MG
2000 TABLET, EXTENDED RELEASE 24 HR ORAL
Qty: 120 TABLET | Refills: 11 | Status: SHIPPED | OUTPATIENT
Start: 2022-05-04 | End: 2023-06-08

## 2022-05-04 RX ORDER — CALCIUM CARBONATE 500 MG/1
1 TABLET, CHEWABLE ORAL 2 TIMES DAILY
Qty: 60 TABLET | Refills: 11 | Status: SHIPPED | OUTPATIENT
Start: 2022-05-04 | End: 2023-05-14

## 2022-05-04 RX ORDER — SEMAGLUTIDE 1.34 MG/ML
0.5 INJECTION, SOLUTION SUBCUTANEOUS WEEKLY
Qty: 1.5 ML | Refills: 3 | Status: SHIPPED | OUTPATIENT
Start: 2022-05-04 | End: 2022-06-01

## 2022-05-04 RX ORDER — FOLIC ACID 1 MG/1
1 TABLET ORAL DAILY
Qty: 90 TABLET | Refills: 3 | Status: SHIPPED | OUTPATIENT
Start: 2022-05-04 | End: 2023-06-02

## 2022-05-04 RX ORDER — DIVALPROEX SODIUM 500 MG/1
1500 TABLET, EXTENDED RELEASE ORAL AT BEDTIME
Qty: 90 TABLET | Refills: 11 | Status: SHIPPED | OUTPATIENT
Start: 2022-05-04 | End: 2023-06-08

## 2022-05-04 RX ORDER — CALCIUM POLYCARBOPHIL 625 MG 625 MG/1
1 TABLET ORAL DAILY
Qty: 90 TABLET | Refills: 3 | Status: SHIPPED | OUTPATIENT
Start: 2022-05-04 | End: 2023-06-08

## 2022-05-04 RX ORDER — LISINOPRIL 5 MG/1
5 TABLET ORAL DAILY
Qty: 30 TABLET | Refills: 11 | Status: SHIPPED | OUTPATIENT
Start: 2022-05-04 | End: 2022-08-24

## 2022-05-04 NOTE — PROGRESS NOTES
Assessment & Plan     Vaginal discharge  Wet prep negative. Given overall normal exam and negative history findings consistent with infection, could be related to skin irritation. Recommended that she stop using the body wash and switch to plain soap. Could also consider urinary source, UA and UC pending.   - Wet preparation  - Urine Culture; Future    Localized swelling of both lower legs  Hypothyroidism, unspecified type  Bilateral lower leg swelling can be multifactorial: consider hypothyroidism, weight gain, clot, new cardiac pathology, nephrotic syndrome. Does not appear to be iatrogenic as her meds that she is taking do not have known side effect. Recommended conservative management with elevation and compression until etiology is clearer.   - US Lower Extremity Venous Duplex Bilateral; Future  - Basic metabolic panel; Future  - N terminal pro BNP outpatient; Future  - UA reflex to Microscopic; Future  - Albumin Random Urine Quantitative with Creat Ratio; Future  - Orthotics and Prosthetics DME Compression; Leg; Thigh (patient needs xxL); Bilateral; 20/30 mmHg; 4 Pair  - TSH with free T4 reflex; Future  - Basic metabolic panel; Future      DME (Durable Medical Equipment) Orders and Documentation  Orders Placed This Encounter   Procedures     Orthotics and Prosthetics DME Compression; Leg; Thigh (patient needs xxL); Bilateral; 20/30 mmHg; 4 Pair      The patient was assessed and it was determined the patient is in need of the following listed DME Supplies/Equipment. Please complete supporting documentation below to demonstrate medical necessity.      DME All Other Item(s) Documentation    List reason for need and supporting documentation for medical necessity below for each DME item.     1. Bilateral lower leg swelling.       Review of prior external note(s) from - previous office visits  Review of the result(s) of each unique test - TSH, T4, BMP  40 minutes spent on the date of the encounter doing chart  "review, history and exam, documentation and further activities per the note       BMI:   Estimated body mass index is 49.19 kg/m  as calculated from the following:    Height as of this encounter: 1.681 m (5' 6.2\").    Weight as of this encounter: 139.1 kg (306 lb 9.6 oz).   Will discuss at later visit    Karyna Centeno MD PGY2  [facility]  Precepted with Dr. hCahal    Subjective   Apple Holliday is a 31 year old who presents for the following health issues  accompanied by her father.    HPI     Presents for 2 week hx of bilateral lower leg swelling. She mentions that this has not happened before. Associated with tense feeling skin, though no open, active bleeding or bruising. No new meds, no recent trauma. Patient mentions that she has a hx of hypothyroidism and that she takes her synthroid as it's prescribed, and is unsure if she had any recent changes to the med. Also associated with the swelling is some unexpected weight gain and increased appetite. Father says that he has noticed this too and wonders if it is related to the leg swelling.    She also wanted to discuss vaginal itching. She had this in the past, and it resolved on its own at that time. Father notes that she has been using a scented body wash. She denies any changes in urination, discharge, or vaginal bleeding. She is not sexually active. Does not have lower abdominal pain, fevers, chills, nausea, vomiting or diarrhea.    Review of Systems   Complete ROS normal aside noted in HPI      Objective    /83   Pulse 110   Temp 98.4  F (36.9  C) (Oral)   Resp 20   Ht 1.681 m (5' 6.2\")   Wt 139.1 kg (306 lb 9.6 oz)   SpO2 97%   BMI 49.19 kg/m    Body mass index is 49.19 kg/m .    Physical Exam   GENERAL: healthy, alert and no distress  NECK: no adenopathy, no asymmetry, masses, or scars and thyroid normal to palpation  RESP: lungs clear to auscultation - no rales, rhonchi or wheezes  CV: regular rate and rhythm, normal " S1 S2, no S3 or S4, no murmur, click or rub, no peripheral edema and peripheral pulses strong  ABDOMEN: soft, nontender, no hepatosplenomegaly, no masses and bowel sounds normal   (female): normal female external genitalia, normal urethral meatus  MS: no gross musculoskeletal defects noted, non pitting edema to knees bilaterally.  SKIN: nonerythematous, no active bleeding, drainage, weeping, bruising noted    Results for orders placed or performed in visit on 05/04/22 (from the past 24 hour(s))   Wet preparation    Specimen: Vagina; Swab   Result Value Ref Range    Trichomonas Absent Absent    Yeast Absent Absent    Clue Cells Absent Absent    WBCs/high power field None None    Narrative    Few bacteria       UA, urine albumin, UC, TSH, BNP, BMP pending  ----- Service Performed and Documented by Resident or Fellow ------

## 2022-05-04 NOTE — PROGRESS NOTES
Medication Therapy Management (MTM) Encounter    ASSESSMENT:                            Medication Adherence/Access: See below for considerations    Type 2 Diabetes: Near A1C goal of < 7%, but unable to completely assess given lack of CGM or BGM data. Would benefit from following-up with [facility] to obtain Dexcom at this time. Additionally, would benefit from switching to Ozempic today so patient can receive maximum weight loss and glycemic benefit with newly approved higher doses. Patient will bring into clinic once approved. Reasonable to decrease dose of Lantus at this time given patient's FPGs are nearing 70 and she is having a few symptomatic lows. Very interested in diabetes education/diet management--would benefit from diabetes education referal today and counseling on possible diet/snack plans in the meantime.    Mood Disorder/Insomnia: Appears stable. Would benefit from switching hydroxyzine to PM slot in pill box to ensure patient is not tired throughout the day. If no difference is noticed, reasonable to discontinue next visit.         Hypothyroidism: Taking levothyroxine with all other morning medications, so absorption may be minimized. However, if TSH come back normal today, reasonable to continue this at this time to maximize adherence. Will reassess next week when lab value results.     Hyperlipidemia: Unclear why patient was prescribed atorvastatin 80 mg daily, as we do not have access to her prior records. Reasonable to stay off at this time given LDL < 190 in December and patient has not been taking for a few months.     Constipation: Would benefit from refill of Fibercon.    GERD: Mild symptoms currently--would benefit from refill of Tums.     PLAN:                            1. DECREASE Lantus to 54 units starting tomorrow  2. Come back 5/11 @ 11AM to start Dexcom  3. Restart Bydureon today. I will work on approval for Ozempic.  4. Refilled all medications.  5. Hydroxyzine in PM slot instead of  AM slot. OK to take all morning medications together for now.   6. Bring in all medications next visit!   7. Education on possible meal ideas as outlined in the AVS  8. Placed diabetes education referral.     Follow-up: In about 1 week with Dr. Chen for Dexcom Education    Medication issues to be addressed at a future visit:      Titrate up Ozempic and taper insulin accordingly    Consider switching levothyroxine to separate morning administration if TSH is elevated today.    Reassess GERD w/ pantoprazole and consider discontinuing or swapping to H2RA.     SUBJECTIVE/OBJECTIVE:                          Apple Holliday is a 31 year old female called for a follow-up visit. Patient was accompanied by her father Jonn. Today is a co-visit with Dr. Meng. Today's visit is a follow-up MTM visit from 2/18/22 with Dr. Chen.    Reason for visit: MTM.    Allergies/ADRs: Reviewed in chart  Past Medical History: Reviewed in chart  Social History     Tobacco Use     Smoking status: Never Smoker     Smokeless tobacco: Never Used   ^Reviewed today    Medication Adherence/Access: Per patient, misses medication 0 times per week. Father sets up pill box for her. They present with box (but not other medications) to check to make sure all pills are in the right place. Atorvastatin and lisinopril are missing at this time.    Type 2 Diabetes:    Currently taking with no complaints of side effects:    Metformin XR 2000 mg    Novolog 12 units TID before meals    Lantus 60 units every morning    Bydureon BCISe    No longer taking cinnamon OTC  Patient is not experiencing side effects.   Blood sugar monitoring: Prescribed Dexcom last visit but patient was not able to obtain. BGM currently--FPG usually 80s-90s. Rarely above 100. Unclear frequency of monitoring. Very interested in starting CGM again. Does not want to try Mark again as the sensor kept falling off even with adhesive.  Symptoms of low blood sugar? Occasional symptomatic  lows. Did not test during this time.   Symptoms of high blood sugar? None  Diet/Exercise: Eager to start Bydureon to help curb her appetite.   Aspirin: Not taking due to age  The ASCVD Risk score (Mena BAZZI Jr., et al., 2013) failed to calculate for the following reasons:    The 2013 ASCVD risk score is only valid for ages 40 to 79  Statin: Yes: Atorvastatin 80 mg daily   ACEi/ARB: Yes: Lisinopril 5 mg daily.   Urine Albumin: No results found for: UMALCR   Lab Results   Component Value Date    A1C 7.9 12/22/2021    A1C 5.9 04/15/2003    A1C 6.1 03/05/2003     Most Recent Immunizations   Administered Date(s) Administered     COVID-19,PF,Pfizer (12+ Yrs) 12/22/2021     DT (PEDS <7y) 07/24/1997     Hep B, Peds or Adolescent 1992     HepB 12/22/1993     HepB, Unspecified 12/22/1993     Hib (PRP-T) 11/15/1994     Hib, Unspecified 11/15/1994     Historical DTP/aP 07/24/1997     Influenza (IIV3) PF 02/15/2005     MMR 08/28/2000     OPV, trivalent, live 07/24/1997     Polio, Unspecified  07/24/1997     Td (Adult), Adsorbed 03/30/2004     Varicella 02/15/2005      Mood Disorder/Insomnia:   Currently taking with no complaints of side effects:    Risperidone 2 mg daily    Depakote ER 1500 mg at bedtime    Hydroxyzine 50 mg every morning--instructed to take in the evening on the bottle. Unclear whether prescribed for insomnia or mood disorder--likely   Patient and father do not complain of mood issues today. All medications prescribed by us still as they are still waiting to see psych (appointment scheduled for October 2022). Father set up hydroxyzine in the pill box in the morning slot on accident (thinking it was levothyroxine). Patient and father do notice mild daytime sleepiness since making this change. Does not struggle with insomnia currently.    Hypothyroidism: Taking levothyroxine 200 mcg and 50 mcg daily with no reports of side effects. These were set up in the pill box with all other morning medications.   TSH  "  Date Value Ref Range Status   05/04/2022 21.24 (H) 0.30 - 5.00 uIU/mL Final   12/22/2021 8.62 (H) 0.30 - 5.00 uIU/mL Final     Hyperlipidemia: Prescribed atorvastatin 80 mg daily but has not been taking. No history of CVA or other secondary prevention indication for statin.   Recent Labs   Lab Test 12/22/21  0951   CHOL 197   HDL 43*   *   TRIG 113     Constipation: Patient complains of mild constipation. Fibercon has been helpful for patient in the past and she requests refill today.     GERD:   Currently taking with no complaints of side effects:    Pantoprazole 40 mg daily  Mild GERD symptoms--Tums have been helpful in the past but patient has run out of them.     BP Readings from Last 1 Encounters:   05/04/22 136/83     Pulse Readings from Last 1 Encounters:   05/04/22 110     Wt Readings from Last 1 Encounters:   05/04/22 306 lb 9.6 oz (139.1 kg)     Ht Readings from Last 1 Encounters:   05/04/22 5' 6.2\" (1.681 m)     Estimated body mass index is 49.19 kg/m  as calculated from the following:    Height as of an earlier encounter on 5/4/22: 5' 6.2\" (1.681 m).    Weight as of an earlier encounter on 5/4/22: 306 lb 9.6 oz (139.1 kg).    Temp Readings from Last 1 Encounters:   05/04/22 98.4  F (36.9  C) (Oral)     ----------------  I spent 60 minutes with this patient today. Dr. Taqueria Saunders was provided the recommendations above in clinic today note and Dr. Cisneros is the authorizing prescriber for this visit through the pharmacist collaborative practice agreement. A copy of this visit note was provided to the patient's PCP.    The patient was given a summary of these recommendations.     Miles Chen, PharmD, [facility]  PGY1 Ambulatory Care Pharmacy Resident     Medication Therapy Recommendations  Diabetes mellitus, type 2 (H)    Current Medication: Continuous Blood Gluc Sensor (DEXCOM G6 SENSOR) MISC   Rationale: Medication product not available - Adherence - Adherence   Recommendation: Provide Adherence " Intervention   Status: Resolved Med Access Issue          Current Medication: calcium carbonate (TUMS) 500 MG chewable tablet   Rationale: Synergistic therapy - Needs additional medication therapy - Indication   Recommendation: Start Medication   Status: Accepted per CPA          Current Medication: insulin glargine (LANTUS SOLOSTAR) 100 UNIT/ML pen   Rationale: Dose too high - Dosage too high - Safety   Recommendation: Decrease Dose   Status: Accepted per CPA         Insomnia    Current Medication: hydrOXYzine (ATARAX) 50 MG tablet   Rationale: Incorrect administration - Adverse medication event - Safety   Recommendation: Change Administration Time   Status: Patient Agreed - Adherence/Education

## 2022-05-04 NOTE — PROGRESS NOTES
Called [facility] to troubleshoot reject for Dexcom supplies. Now all covered and they will get ready for patient later today.    Miles Chen, PharmD, [facility]  PGY1 Ambulatory Care Pharmacy Resident

## 2022-05-04 NOTE — Clinical Note
Given her TSH is so high, and her Levothyroxine dose is also so high. Maybe opportunity is to discontinue her scheduled Calcium (Tums) as this is likely binding her Levothyroxine. If she needs GERD agent, maybe Famotidine as needed. Just my thoughts!

## 2022-05-04 NOTE — PATIENT INSTRUCTIONS
We will call with results, please use compression stockings as often as possible and elevate at rest for lower leg swelling.

## 2022-05-04 NOTE — PATIENT INSTRUCTIONS
Recommendations from today's MTM visit:                                                    MTM (medication therapy management) is a service provided by a clinical pharmacist designed to help you get the most of out of your medicines.   Today we reviewed what your medicines are for, how to know if they are working, that your medicines are safe and how to make your medicine regimen as easy as possible.      Snack Ideas for your Meal Plan     Protein (1 serving = 6-8 grams of protein each)  Beef Jerky ( 2 pieces)  Beef Sticks, plain (1 stick)  Cheese/Cheese Stick (1 oz)  Chicken breast (1 oz)  Cottage cheese, low fat (1/4 cup)  Crab, Imitation (2 oz)  Deli Meat (2 oz)  Edamame, boiled (1/2 cup)  Egg, hardboiled (1)  Shrimp, small (10 pieces)   Turkey Breast (1 oz)  Tuna, canned in water (1 oz)  Fairlife Milk (1/2 cup)  Yogurt, Greek : Siggis, Oikos Triple Zero, Dannon Light and Fit, etc (6 oz)    Carb (1 carb choice/serving = 15 grams)   Apple (medium)  Applesauce, unsweetened (1/2 cup)  Apricots, dried (4 whole)  Banana, medium (1/2)  Bread, 1 slice   Cantaloupe/Melon (1 cup)  Crackers 4-6 (varies by brand)  Cherries (15)  Cranberries, Dried (2 tbsp)  Dark Chocolate (1 oz)  Dates, dried (2-3 pieces)  Fruit cocktail, in own juice (1/2 cup)  Granola Bar (vary by brand)  Grapes (1/2 cup)  Ice cream, slow churned/low fat (1/2 cup)  Kiwi (~2)  Mixed Berries (1 cup)  Orange (1 medium)  Peach (1 medium)  Pear (1/2 medium)  Plums (2)  Pomegranate (1 small)  Popcorn, stove popped (2 cups)  Pretzels (3/4 oz)  Pudding, sugar free (1/2 cup)  Raisins (2 Tbsp)  Rice Cake, (2)  Skim or 1% milk (1 cup)  Tortilla Chips (1 oz)  Yogurt (greek or plain)   cup    Fat (1 serving = 100 calories)  Almonds (2 Tbsp)  Avocado (1/3 fruit OR 3 Tbsp)  Cashews (11 whole)  Cheese (1 oz)  Chocolate, dark (3/4 oz)  Coconut, unsweetened (1/3 c shredded)  Hummus (3 Tbsp)  Peanuts (20 pieces)  Peanut/Nut Butter (1 Tbsp)  Pecans (10 halves)  Pumpkin  seeds, unshelled (2 Tbsp)  Salad dressing  (1-2 Tbsp)  Sunflower seeds (2 Tbsp)  Vegetable Dip (1-2 Tbsp)  Walnuts (8 halves)    Free Foods  Bell Peppers  Broccoli  Carrots   Cauliflower   Celery  Coffee, black (regular or decaf)  Cucumber  Gelatin, Sugar Free  Herbal Tea, unsweetened   Pea Pods  Pickles (high in sodium)   Popsicle, Sugar Free  Salsa (2 Tbsp)  Tomatoes    Combination Snacks  Apple + 1 Tbsp peanut butter (carbohydrate + fat)  Handful crackers + 1 oz cheese (carbohydrate + fat or protein)  Carrot sticks + Hummus (free food + fat)  1 piece of peanut butter toast (carbohydrate + fat)  Greek yogurt + 2 Tbsp sunflower seeds (carbohydrate + fat)  Hard-boiled egg +   cup grapes (protein + carbohydrate)  1 piece of avocado toast (carbohydrate + fat)  Cucumbers + dip (free food + fat)     BREAKFAST:  Scrambled eggs with sausage or lindo and avocado  Omelet with veggies and ham   Coconut flour pancakes with whipped cream and fruit  Hard-boiled eggs with cauliflower hashbrowns and asparagus   Yadiel pudding with berries   Plain greek yogurt with ground flax, nuts, cinnamon and/or berries  Protein shake (ideas: greek yogurt, cream, coconut milk/oil, avocado, low carb protein powder, small amount fruit, nuts/peanut butter, cinnamon, dark cocoa)    LUNCH/DINNER:  Make veggie noodles with a spiralizer (zucchini, carrots, beets, sweet potatoes)  Beef stir-khan   Chicken or beef stuffed bell peppers  Quinoa with veggies and chicken sausage  Cauliflower crust pizza (frozen crust at  [employer])  Beef, fish, or chicken tacos with homemade cheese taco shells  Lemon butter and pepper chicken wings with carrots and celery  Grilled chicken or steak kabobs with veggies  Zucchini pasta in avocado pesto with stir fried veggies  Dianne chicken lettuce wraps  Tomato, cucumber, and avocado salad with chicken  Pear and arugula salad with pine nuts and garbanzo beans  Pulled pork sliders on almond flour buns   Beef chili with beans  and green pepper  Korean beef with cauliflower rice  BBQ shrimp skewers with zucchini fries   Taco salad  Baked salsa cheesy chicken   Creamy cauliflower, ham, and cheese soup  Mozzarella stuffed meatballs  Grilled salmon or walleye with Brussel sprouts  Spaghetti squash with lindo and brian cheese     SNACKS:  Pickles, olives, cherry tomatoes   Beef stick with string cheese or cold cut cheese roll up  Mixed nuts  Carrots, peppers, celery with hummus  Apple with natural peanut butter or almond butter  Hard-boiled egg  Quest Protein Bar    DECREASE Lantus to 54 units starting tomorrow  Come back 5/11 @ 11AM to start Dexcom  Restart Bydureon today. I will work on approval for Ozempic  Refilled all medications.  Hydroxyzine in PM slot instead of AM slot. OK to take all morning medications together for now.   Bring in all medications next visit!     It was great to speak with you today!  I value your experience and would be very thankful for your time with providing feedback on our clinic survey. You may receive a survey via email or text message in the next few days.     To schedule another MTM appointment, please call the clinic directly or you may call the MTM scheduling line at [phone] or toll-free at [phone].     My Clinical Pharmacist's contact information:                                                      Please feel free to contact me with any questions or concerns you have!     Miles Chen, PharmD, [facility]  PGY1 Ambulatory Care Pharmacy Resident    [address] [address]  Office Phone: [phone]

## 2022-05-04 NOTE — TELEPHONE ENCOUNTER
[facility] PA REQUEST     Prior Authorization Retail Medication Request     Medication/Dose: Starting dose:  Ozempic1.5 mL for 30 day supply. Approval for both 0.25/0.5 mg pen and 1 mg pen please  Administer 0.5 mg under the skin once weekly for 28 days, then 1 mg once weekly thereafter.      ICD code (if different than what is on RX):  E11.9     Previously Tried and Failed:  Insulin glargine, insulin aspart, metformin, and Bydureon BCISe.    Rationale: Apple Holliday is a 31 year old female with uncontrolled diabetes (A1C 7.9) despite the above therapies tried. Patient has not tolerated Bydureon BCISe. Additionally, patient is having difficulty managing injections, and the addition of a once daily Victoza or twice daily Byetta is not reasonable for this patient as adherence is a concern. A once-weekly GLP-1 would be preferred for this patient to ensure they maintain adherence to her regimen and help achieve their glycemic goals. Would also benefit from well-documented cardiovascular and weight loss benefits of Ozempic, which is more potent than Victoza with the new higher dose indications.  If patient were to be prescribed Victoza or Byetta, they would struggle with adherence as injections are very troublesome for them (despite rotation of injection sites of current insulin doses). Please consider approval of Ozempic to help the patient achieve her glycemic and weight loss goals.     Insurance Name:  Dominic SWANN  Insurance ID:  [id number]    Pharmacy Information (if different than what is on RX)  Name:  [facility]  Phone:  [phone]     Miles Chen, PharmD, [facility]  PGY1 Ambulatory Care Pharmacy Resident

## 2022-05-04 NOTE — PROGRESS NOTES
Preceptor Attestation:    I discussed the patient with the resident and evaluated the patient in person. I have verified the content of the note, which accurately reflects my assessment of the patient and the plan of care.   Supervising Physician:  Emory Chahal DO.

## 2022-05-05 LAB — NT-PROBNP SERPL-MCNC: 11 PG/ML (ref 0–450)

## 2022-05-06 LAB — BACTERIA UR CULT: NORMAL

## 2022-05-09 NOTE — TELEPHONE ENCOUNTER
Central Prior Authorization Team  Phone: [phone]    PA Initiation    Medication: OZEMPIC, 0.25 OR 0.5 MG/DOSE  Insurance Company: [employer] - Phone [phone] Fax [phone]  Pharmacy Filling the Rx: [id number] - [place], [place] - [address] [place] [address] AT [facility]  Filling Pharmacy Phone: [phone]  Filling Pharmacy Fax:    Start Date: 5/9/2022

## 2022-05-10 NOTE — TELEPHONE ENCOUNTER
Prior Authorization Approval    Authorization Effective Date: 4/10/2022  Authorization Expiration Date: 5/10/2023  Medication: OZEMPIC, 0.25 OR 0.5 MG/DOSE- APPROVED   Approved Dose/Quantity:   Reference #:     Insurance Company: [employer] - Phone [phone] Fax [phone]  Expected CoPay:       CoPay Card Available:      Foundation Assistance Needed:    Which Pharmacy is filling the prescription (Not needed for infusion/clinic administered): [id number] - [place], [place] - [address] [place] [address]  Pharmacy Notified: Yes  Patient Notified:  **Instructed pharmacy to notify patient when script is ready to /ship.**

## 2022-05-27 DIAGNOSIS — E03.9 HYPOTHYROIDISM, UNSPECIFIED TYPE: ICD-10-CM

## 2022-05-27 RX ORDER — LEVOTHYROXINE SODIUM 50 UG/1
50 TABLET ORAL DAILY
Qty: 60 TABLET | Refills: 0 | Status: SHIPPED | OUTPATIENT
Start: 2022-05-27 | End: 2023-05-14

## 2022-07-18 ENCOUNTER — TELEPHONE (OUTPATIENT)
Dept: FAMILY MEDICINE | Facility: CLINIC | Age: 32
End: 2022-07-18

## 2022-07-18 NOTE — TELEPHONE ENCOUNTER
Called patient and notified them that they needed to schedule an appointment to fill out the [facility] mobility form

## 2022-07-22 DIAGNOSIS — Z79.4 TYPE 2 DIABETES MELLITUS WITHOUT COMPLICATION, WITH LONG-TERM CURRENT USE OF INSULIN (H): ICD-10-CM

## 2022-07-22 DIAGNOSIS — E11.9 TYPE 2 DIABETES MELLITUS WITHOUT COMPLICATION, WITH LONG-TERM CURRENT USE OF INSULIN (H): ICD-10-CM

## 2022-08-24 DIAGNOSIS — E03.9 HYPOTHYROIDISM, UNSPECIFIED TYPE: ICD-10-CM

## 2022-08-24 DIAGNOSIS — E11.9 TYPE 2 DIABETES MELLITUS WITHOUT COMPLICATION, WITH LONG-TERM CURRENT USE OF INSULIN (H): ICD-10-CM

## 2022-08-24 DIAGNOSIS — Z79.4 TYPE 2 DIABETES MELLITUS WITHOUT COMPLICATION, WITH LONG-TERM CURRENT USE OF INSULIN (H): ICD-10-CM

## 2022-08-26 RX ORDER — RISPERIDONE 2 MG/1
2 TABLET ORAL DAILY
Qty: 60 TABLET | OUTPATIENT
Start: 2022-08-26

## 2022-08-26 RX ORDER — LEVOTHYROXINE SODIUM 50 UG/1
50 TABLET ORAL DAILY
Qty: 60 TABLET | OUTPATIENT
Start: 2022-08-26

## 2022-08-26 RX ORDER — LISINOPRIL 5 MG/1
5 TABLET ORAL DAILY
Qty: 30 TABLET | Refills: 1 | Status: SHIPPED | OUTPATIENT
Start: 2022-08-26 | End: 2022-10-19

## 2022-09-18 ENCOUNTER — HOSPITAL ENCOUNTER (EMERGENCY)
Facility: CLINIC | Age: 32
Discharge: HOME | End: 2022-09-18
Attending: EMERGENCY MEDICINE | Admitting: EMERGENCY MEDICINE
Payer: COMMERCIAL

## 2022-09-18 VITALS
SYSTOLIC BLOOD PRESSURE: 127 MMHG | DIASTOLIC BLOOD PRESSURE: 85 MMHG | OXYGEN SATURATION: 99 % | RESPIRATION RATE: 16 BRPM | HEART RATE: 88 BPM | TEMPERATURE: 99.2 F

## 2022-09-18 DIAGNOSIS — R46.89 AGGRESSIVE BEHAVIOR: ICD-10-CM

## 2022-09-18 PROCEDURE — 99285 EMERGENCY DEPT VISIT HI MDM: CPT | Mod: 25 | Performed by: EMERGENCY MEDICINE

## 2022-09-18 PROCEDURE — 90791 PSYCH DIAGNOSTIC EVALUATION: CPT

## 2022-09-18 PROCEDURE — 99283 EMERGENCY DEPT VISIT LOW MDM: CPT | Performed by: EMERGENCY MEDICINE

## 2022-09-18 RX ORDER — RISPERIDONE 0.5 MG/1
2 TABLET, ORALLY DISINTEGRATING ORAL ONCE
Status: DISCONTINUED | OUTPATIENT
Start: 2022-09-18 | End: 2022-09-19 | Stop reason: HOSPADM

## 2022-09-18 RX ORDER — RISPERIDONE 2 MG/1
2 TABLET ORAL DAILY
Qty: 14 TABLET | Refills: 0 | Status: SHIPPED | OUTPATIENT
Start: 2022-09-18 | End: 2023-06-08

## 2022-09-18 ASSESSMENT — ACTIVITIES OF DAILY LIVING (ADL): ADLS_ACUITY_SCORE: 35

## 2022-09-18 ASSESSMENT — ENCOUNTER SYMPTOMS: AGITATION: 1

## 2022-09-19 NOTE — ED NOTES
Pt VSS on RA, again reporting that they do not want to go to the  but compliant with EMS and transport. Discharge packet and prescriptions sent with EMS to . Pt discharged off unit on stretcher.

## 2022-09-19 NOTE — DISCHARGE INSTRUCTIONS
Please ask your Group Home to arrange with Your Primary Care Provider a refill on your medication.     Aftercare Plan  If I am feeling unsafe or I am in a crisis, I will:   Contact my established care providers   Call the National Suicide Prevention Lifeline: 988  Go to the nearest emergency room   Call 911     Warning signs that I or other people might notice when a crisis is developing for me: irritability, frustration, not feeling heard, throwing stuff    Things I am able to do on my own to cope or help me feel better: walk away, watch TV/movies, listen to music     Things that I am able to do with others to cope or help me better: call my dad or my uncle, Wilman, talk to staff     Things I can use or do for distraction:     The box breathing method  1. Close your eyes. Breathe in through your nose while counting to four slowly. ...  2. Hold your breath inside while counting slowly to four. Try not to clamp your mouth or nose shut.  3. Begin to slowly exhale for 4 seconds.  4. Repeat steps 1 to 3 at least 10 times.    Use your five senses to help you move through distress.  1. Put your hands in cold water. ...  2.  or touch items near you. ...  3. Breathe deeply. ...  4. Savor a food or drink. ...  5. Take a short walk. ...  6. Hold a piece of ice. ...  7. Savor a scent. ...  8. Move your body.    5,4,3,2,1 Grounding Exercise  Grounding is a technique that helps us reorient to the here-and-now, to bring up into the present. They are a useful technique if you ever feel overwhelmed, intensely anxious, or dissociated from your environment. The  5,4,3,2,1  exercise is a common sensory awareness grounding exercise that many find helpful to relax or get through difficult moments.  1. Describe 5 things you can see in the room  2. Name 4 things you can feel ( my feet on the floor  or  the air in my nose )  3. Name 3 things you are hear right now ( traffic outside )  4. Name 2 things you can smell right now (or 2  smells that you like)  5. Name 1 thing you like about yourself     5 4 3 2 1 CALM exercise to engage your senses:  5: Acknowledge FIVE things you see around you. Maybe it is a bird, maybe it is pencil, maybe it is a spot on the ceiling, however big or small, state 5 things you see.  4: Acknowledge FOUR things you can touch around you. Maybe this is your hair, hands, ground, grass, pillow, etc, whatever it may be, list out the 4 things you can feel.  3: Acknowledge THREE things you hear. This needs to be external, do not focus on your thoughts; maybe you can hear a clock, a car, a dog park. or maybe you hear your tummy rumbling, internal noises that make external sounds can count, what is audible in the moment is what you list.  2: Acknowledge TWO things you can smell: This one might be hard if you are not in a stimulating environment, if you cannot automatically sniff something out, walk nearby to find a scent. Maybe you walk to your bathroom to smell soap or outside to smell anything in nature, or even could be as simple as leaning over and smelling a pillow on the couch, or a pencil. Whatever it may be, take in the smells around you.  1. Acknowledge ONE thing you can taste. What does the inside of your mouth taste like, gum, coffee, or the sandwich from lunch? Focus on your mouth as the last step and take in what you can taste.      Changes I can make to support my mental health and wellness: take medications as prescribed, attend all scheduled appointments, create a calm space to relax in     People in my life that I can ask for help: Dad, Uncle Wilman, group home staff     Your [facility] has a mental health crisis team you can call 24/7: [facility] Mobile Crisis  [phone]     Other things that are important when I'm in crisis: being in a safe environment around people I trust     Additional resources and information:     Crisis Lines  Crisis Text Line  Text 042760  You will be connected with a trained  "live crisis counselor to provide support.    Por carmenleslie, texto  MARYJANE a 126676 o texto a 442-AYUDAME en WhatsApp    The Silver[employer] Project (LGBTQ Youth Crisis Line)  7.505.012.0429  text START to 576-487      Community Resources  Fast Tracker  Linking people to mental health and substance use disorder resources  [employer]     [place] Mental Health Warm Line  Peer to peer support  Monday thru Saturday, 12 pm to 10 pm  015.672.8810 or 5.589.754.9140  Text \"Support\" to 16220    National [place] on Mental Illness (KAREN)  412.208.9492 or 1.888.KAREN.HELPS      Mental Health Apps  My3  https://Sustaination.org/    VirtualHopeBox  https://Organic Motion/apps/virtual-hope-box/      Additional Information  Today you were seen by a licensed mental health professional through Triage and Transition services, Behavioral Healthcare Providers ([facility])  for a crisis assessment in the Emergency Department at [facility].  It is recommended that you follow up with your established providers (psychiatrist, mental health therapist, and/or primary care doctor - as relevant) as soon as possible. Coordinators from [facility] will be calling you in the next 24-48 hours to ensure that you have the resources you need.  You can also contact [facility] coordinators directly at [phone]. You may have been scheduled for or offered an appointment with a mental health provider. [facility] maintains an extensive network of licensed behavioral health providers to connect patients with the services they need.  We do not charge providers a fee to participate in our referral network.  We match patients with providers based on a patient's specific needs, insurance coverage, and location.  Our first effort will be to refer you to a provider within your care system, and will utilize providers outside your care system as needed.         "
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
## 2022-09-19 NOTE — ED NOTES
Pt offered ordered medication, pt refused. Writer asked the pt questions to attempt to understand why she doesn't want to take her medications. Pt could not articulate why she did not want to take the medications. MD notified. Writer and MD approached the pt about her disposition. Pt continued to refuse the medication and reported she does not want to go back to her GH.
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
## 2022-09-19 NOTE — ED NOTES
Bed: ED16B  Expected date: 9/18/22  Expected time: 7:16 PM  Means of arrival: Ambulance  Comments:  [id number]  31F  NICHOLE PHILLIPS
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
## 2022-09-22 DIAGNOSIS — K21.9 GASTROESOPHAGEAL REFLUX DISEASE, UNSPECIFIED WHETHER ESOPHAGITIS PRESENT: ICD-10-CM

## 2022-09-22 NOTE — TELEPHONE ENCOUNTER
[facility] phone call message- patient requesting a refill:    Full Medication Name: pantoprazole     Dose: 40 mg    Pharmacy confirmed as       [facility] [place] - [place] - [address]  [address]  [place] [address]  Phone: [phone] Fax: [phone]  : Yes    Additional Comments:      OK to leave a message on voice mail? Yes    Primary language: English      needed? No    Call taken on September 22, 2022 at 1:20 PM by Frank Archuleta

## 2022-09-23 RX ORDER — PANTOPRAZOLE SODIUM 40 MG/1
40 TABLET, DELAYED RELEASE ORAL DAILY
Qty: 30 TABLET | Refills: 1 | Status: SHIPPED | OUTPATIENT
Start: 2022-09-23 | End: 2022-10-19

## 2022-09-27 ENCOUNTER — HOSPITAL ENCOUNTER (EMERGENCY)
Facility: CLINIC | Age: 32
Discharge: NURSING, REHAB OR LONG-TERM CARE | End: 2022-09-27
Attending: EMERGENCY MEDICINE | Admitting: EMERGENCY MEDICINE
Payer: COMMERCIAL

## 2022-09-27 VITALS
DIASTOLIC BLOOD PRESSURE: 88 MMHG | SYSTOLIC BLOOD PRESSURE: 132 MMHG | TEMPERATURE: 98.4 F | OXYGEN SATURATION: 98 % | RESPIRATION RATE: 18 BRPM | HEART RATE: 106 BPM

## 2022-09-27 DIAGNOSIS — R46.89 AGGRESSIVE BEHAVIOR: ICD-10-CM

## 2022-09-27 LAB
AMPHETAMINES UR QL SCN: NORMAL
BARBITURATES UR QL: NORMAL
BENZODIAZ UR QL: NORMAL
CANNABINOIDS UR QL SCN: NORMAL
COCAINE UR QL: NORMAL
OPIATES UR QL SCN: NORMAL

## 2022-09-27 PROCEDURE — 90791 PSYCH DIAGNOSTIC EVALUATION: CPT

## 2022-09-27 PROCEDURE — 99285 EMERGENCY DEPT VISIT HI MDM: CPT | Mod: 25 | Performed by: EMERGENCY MEDICINE

## 2022-09-27 PROCEDURE — 80307 DRUG TEST PRSMV CHEM ANLYZR: CPT | Performed by: EMERGENCY MEDICINE

## 2022-09-27 PROCEDURE — 99283 EMERGENCY DEPT VISIT LOW MDM: CPT | Performed by: EMERGENCY MEDICINE

## 2022-09-27 RX ORDER — OXYCODONE HYDROCHLORIDE 5 MG/1
5 TABLET ORAL ONCE
Status: DISCONTINUED | OUTPATIENT
Start: 2022-09-27 | End: 2022-09-27

## 2022-09-27 ASSESSMENT — ACTIVITIES OF DAILY LIVING (ADL)
ADLS_ACUITY_SCORE: 35

## 2022-09-27 NOTE — ED NOTES
Bed: HW03  Expected date: 9/27/22  Expected time: 1:30 AM  Means of arrival: Ambulance  Comments:  Tyler 095  31 F  SI
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
## 2022-09-27 NOTE — DISCHARGE INSTRUCTIONS
"Aftercare Plan  If I am feeling unsafe or I am in a crisis, I will:   Contact my established care providers   Call the National Suicide Prevention Lifeline: 988  Go to the nearest emergency room   Call 911     Warning signs that I or other people might notice when a crisis is developing for me: \"I get upset.  I don't want to be at that group home.\"    Things I am able to do on my own to cope or help me feel better: \"Take my medications.  I took them.\"     Things that I am able to do with others to cope or help me feel better: \"Activities.  I have a good time talking to people.  I like being around people.\"     Things I can use or do for distraction: \"Listening to rap music, watching [place] Alessandro shows.\"     Changes I can make to support my mental health and wellness: \"I don't know.\"     People in my life that I can ask for help: Dad, uncle, staff,     Your [facility] has a mental health crisis team you can call 24/7: [facility] [place] [facility] Mobile Crisis  [phone]     Other things that are important when I'm in crisis: Talked to Aj at facility about increasing or tailoring activities worker to assist patient with mood.     Additional resources and information: [facility] may assist staff with patient's aggressive behaviors.  Call [phone] and ask for Geriatric/Residential services.         Crisis Lines  Crisis Text Line  Text 120924  You will be connected with a trained live crisis counselor to provide support.    Por espanol, texto  MARYJANE a 362851 o texto a 442-AYUDAME en WhatsApp    The Silverio Project (LGBTQ Youth Crisis Line)  1.148.336.1547  text START to 466-435      Community Resources  Fast Tracker  Linking people to mental health and substance use disorder resources  fastLeostreamckKoudain.org     [place] Mental Health Warm Line  Peer to peer support  Monday thru Saturday, 12 pm to 10 pm  375.515.4674 or 1.410.309.5467  Text \"Support\" to 96467    National [place] on Mental " Illness (KAREN)  227.088.4734 or 1.888.KAREN.HELPS      Mental Health Apps  My3  https://myLocalitypp.org/    VirtualHopeBox  https://[employer]/apps/virtual-hope-box/      Additional Information  Today you were seen by a licensed mental health professional through Triage and Transition services, [facility] (P)  for a crisis assessment in the Emergency Department at [facility].  It is recommended that you follow up with your established providers (psychiatrist, mental health therapist, and/or primary care doctor - as relevant) as soon as possible. Coordinators from [facility] will be calling you in the next 24-48 hours to ensure that you have the resources you need.  You can also contact [facility] coordinators directly at [phone]. You may have been scheduled for or offered an appointment with a mental health provider. [facility] maintains an extensive network of licensed behavioral health providers to connect patients with the services they need.  We do not charge providers a fee to participate in our referral network.  We match patients with providers based on a patient's specific needs, insurance coverage, and location.  Our first effort will be to refer you to a provider within your care system, and will utilize providers outside your care system as needed.

## 2022-09-27 NOTE — ED NOTES
Writer called and spoke with Aj manager of [facility]. Aj reports it is okay to send patient back to [facility] and she can come by taxi.
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
## 2022-10-04 ENCOUNTER — HOSPITAL ENCOUNTER (EMERGENCY)
Facility: CLINIC | Age: 32
Discharge: NURSING, REHAB OR LONG-TERM CARE | End: 2022-10-05
Attending: FAMILY MEDICINE | Admitting: FAMILY MEDICINE
Payer: COMMERCIAL

## 2022-10-04 DIAGNOSIS — Q86.0 FETAL ALCOHOL SYNDROME: ICD-10-CM

## 2022-10-04 DIAGNOSIS — F91.3 OPPOSITIONAL DEFIANT DISORDER: ICD-10-CM

## 2022-10-04 DIAGNOSIS — R46.89 AGGRESSIVE BEHAVIOR: ICD-10-CM

## 2022-10-04 LAB
GLUCOSE BLDC GLUCOMTR-MCNC: 159 MG/DL (ref 70–99)
GLUCOSE BLDC GLUCOMTR-MCNC: 244 MG/DL (ref 70–99)

## 2022-10-04 PROCEDURE — 250N000013 HC RX MED GY IP 250 OP 250 PS 637: Performed by: FAMILY MEDICINE

## 2022-10-04 PROCEDURE — 99285 EMERGENCY DEPT VISIT HI MDM: CPT | Mod: 25 | Performed by: FAMILY MEDICINE

## 2022-10-04 PROCEDURE — 90791 PSYCH DIAGNOSTIC EVALUATION: CPT

## 2022-10-04 PROCEDURE — 99284 EMERGENCY DEPT VISIT MOD MDM: CPT | Performed by: FAMILY MEDICINE

## 2022-10-04 RX ORDER — HYDROXYZINE HYDROCHLORIDE 50 MG/1
50 TABLET, FILM COATED ORAL AT BEDTIME
Status: DISCONTINUED | OUTPATIENT
Start: 2022-10-04 | End: 2022-10-05 | Stop reason: HOSPADM

## 2022-10-04 RX ORDER — DIVALPROEX SODIUM 500 MG/1
1500 TABLET, EXTENDED RELEASE ORAL ONCE
Status: COMPLETED | OUTPATIENT
Start: 2022-10-04 | End: 2022-10-04

## 2022-10-04 RX ORDER — RISPERIDONE 2 MG/1
2 TABLET ORAL ONCE
Status: DISCONTINUED | OUTPATIENT
Start: 2022-10-04 | End: 2022-10-05 | Stop reason: HOSPADM

## 2022-10-04 RX ORDER — OLANZAPINE 10 MG/1
10 TABLET, ORALLY DISINTEGRATING ORAL ONCE
Status: COMPLETED | OUTPATIENT
Start: 2022-10-04 | End: 2022-10-04

## 2022-10-04 RX ADMIN — DIVALPROEX SODIUM 1500 MG: 500 TABLET, FILM COATED, EXTENDED RELEASE ORAL at 21:55

## 2022-10-04 RX ADMIN — OLANZAPINE 10 MG: 10 TABLET, ORALLY DISINTEGRATING ORAL at 20:45

## 2022-10-04 RX ADMIN — METFORMIN HYDROCHLORIDE 2000 MG: 750 TABLET, EXTENDED RELEASE ORAL at 21:55

## 2022-10-04 RX ADMIN — HYDROXYZINE HYDROCHLORIDE 50 MG: 50 TABLET, FILM COATED ORAL at 21:55

## 2022-10-04 ASSESSMENT — ACTIVITIES OF DAILY LIVING (ADL)
ADLS_ACUITY_SCORE: 35

## 2022-10-04 NOTE — ED TRIAGE NOTES
Triage Assessment     Row Name 10/04/22 2546       Triage Assessment (Adult)    Airway WDL WDL       Respiratory WDL    Respiratory WDL WDL       Cardiac WDL    Cardiac WDL WDL
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
## 2022-10-04 NOTE — ED NOTES
Bed: HW05  Expected date: 10/4/22  Expected time: 6:40 PM  Means of arrival:   Comments:  N735  32F  Mental health eval

## 2022-10-05 VITALS
SYSTOLIC BLOOD PRESSURE: 144 MMHG | HEART RATE: 104 BPM | DIASTOLIC BLOOD PRESSURE: 79 MMHG | TEMPERATURE: 98.8 F | OXYGEN SATURATION: 99 % | RESPIRATION RATE: 16 BRPM

## 2022-10-05 PROCEDURE — 250N000012 HC RX MED GY IP 250 OP 636 PS 637: Performed by: FAMILY MEDICINE

## 2022-10-05 PROCEDURE — 96372 THER/PROPH/DIAG INJ SC/IM: CPT | Performed by: FAMILY MEDICINE

## 2022-10-05 RX ADMIN — INSULIN ASPART 12 UNITS: 100 INJECTION, SOLUTION INTRAVENOUS; SUBCUTANEOUS at 00:50

## 2022-10-05 NOTE — DISCHARGE INSTRUCTIONS
Aftercare Plan  If I am feeling unsafe or I am in a crisis, I will:   Contact my established care providers   Call the National Suicide Prevention Lifeline: 484.691.3226   Go to the nearest emergency room   Call 911     Warning signs that I or other people might notice when a crisis is developing for me:     I am having increasing suicidal thoughts that turn to plans with intent or means  I am having additional urges to self-harm    My emotions are of hopelessness; feeling like there's no way out.  Rage or anger.  Engaging in risky activities without thinking  Withdrawing from family/friends  Dramatic mood swings  Drastic personality changes   Use of alcohol or drugs  Postings on social media  Neglect of personal hygiene or cares     Things I am able to do on my own to cope or help me feel better:    Spending quality time with loved ones  Staying hydrated  Eating balanced meals  Going for a walk every day  Take care of daily responsibilities/needs  Focus on positive self-talk vs negative self-talk    Things that I am able to do with others to cope or help me better:   Exercise  Music  Deep breathing  Meditations  Journal  Self-regulate  Self check-in  Ask for help    Things I can use or do for distraction:   Reach out to/spend time with family, friends  Shower  Exercise  Chores or do a project  Listen to music  Watch movie/TV  Listening to music  Journaling  Reading a book  Meditating  Call a friend    Changes I can make to support my mental health and wellness:    -I will abstain from all mood altering chemicals not currently prescribed to me   -I will attend scheduled mental health therapy and psychiatric appointments and follow all   recommendations  -I will commit to 30 minutes of self care daily - this can be as simple as taking a shower, going for a   walk, cooking a meal, read, writing, etc  -I will practice square breathing when I begin to feel anxious - in breath through the nose for the count   of 4 and  the first line on the square. Out breath through the mouth for the count of 4 for the second line   of the square. Repeat to complete the square. Repeat the square as many times as needed.  - I will use distraction skills of: going for walks, watching TV, spending time outside, calling a friend or   family member  -Use community resources, including [employer] numbers, [facility] crisis and support meetings  -Maintain a daily schedule/routine  -Practice deep breathing skills  -Download a meditation theron and spend 15-20 minutes per day mediating/relaxing. Some apps to   download include: Calm, Headspace and Insight Timer. All 3 of these apps have free version    Reduce Extreme Emotion  QUICKLY:  Changing Your Body Chemistry      T:  Change your body Temperature to change your autonomic nervous system   Use Ice Water to calm yourself down FAST   Put your face in a bowl of ice water (this is the best way; have the person keep his/her face in ice water for 30-45 seconds - initial research is showing that the longer s/he can hold her/his face in the water, the better the response), or   Splash ice water on your face, or hold an ice pack on your face      I:  Intensely exercise to calm down a body revved up by emotion   Examples: running, walking fast, jumping, playing basketball, weight lifting, swimming, calisthenics, etc.   Engage in exercises that DO NOT include violent behaviors. Exercises that utilize violent behaviors tend to function as  behavioral rehearsal,  and rather than calming the person down, may actually  rev  the person up more, increasing the likelihood of violence, and lessening the likelihood that they will  burn off  energy     P:  Progressively relax your muscles   Starting with your hands, moving to your forearms, upper arms, shoulders, neck, forehead, eyes, cheeks and lips, tongue and teeth, chest, upper back, stomach, buttocks, thighs, calves, ankles, feet   Tense (10 seconds,   of the way), then relax  "each muscle (all the way)   Notice the tension   Notice the difference when relaxed (by tensing first, and then relaxing, you are able to get a more thorough relaxation than by simply relaxing)      P: Paced breathing to relax   The standard technique is to begin with counting the number of steps one takes for a typical inhale, then counting the steps one takes for a typical exhale, and then lengthening the amount of steps for the exhalation by one or two steps.  OR  Repeat this pattern for 1-2 minutes  Inhale for four (4) seconds   Exhale for six (6) to eight (8) seconds   Research demonstrated that one can change one's overall level of anxiety by doing this exercise for even a few minutes per day      People in my life that I can ask for help:   Family  Friends  Providers        Other things that are important when I'm in crisis:   Ask for help    Additional resources and information:     Mental Health Apps  My3  https://Algramo.Cartesian/    VirtualHopeBox  https://[employer]/apps/virtual-hope-box/       Professionals or Agencies I Can Contact During A Crisis:       Crisis Lines  Crisis Text Line  Text 038645  You will be connected with a trained live crisis counselor to provide support.    The Silverio Project (LGBTQ Youth Crisis Line)  4.764.079.3620  text START to 962-854    National [place] on Mental Illness (KAREN)  698.449.2228 or 1.888.KAREN.HELPS    National Suicide Prevention Lifeline at 5-711-860-TUQC (0179)     Throughout  [place]: call **CRISIS (**498896)     Crisis Text Line: is available for free, 24/7 by texting [place] to 224662    Community Resources  Fast Tracker  Linking people to mental health and substance use disorder resources  fastCorMedixckKsplicen.org     [place] Mental Health Warm Line  Peer to peer support  Monday thru Saturday, 12 pm to 10 pm  772.194.2686 or 6.063.391.6923  Text \"Support\" to 60880     National [place] on Mental Illness (www.[place].karen.org): 711.337.1776 or " 603.126.5009     [facility] Phone (free remote counseling): 232.130.8913 Web address:   https://DiscountDoc.org/     www.[employer] (filter for insurance, gender preference, etc.)    [facility]   [phone]  Intake appointment will be virtual, following appointments can be in person or virtual.   **IMMEDIATE OPENINGS**    [facility]  [phone]  *offers individual therapy, medication management and Mental Health Case Workers; can self refer    [facility]  [phone]  *Immediate Openings    Please follow up with scheduled providers to ensure all necessary paperwork is filled out prior to your   scheduled telehealth appointments.     Coordinators from [facility] will be calling within two business days to ensure   that you have the resources you may need or provide assistance with scheduling (Phone number: [phone].).    Remember: give the referrals 3 sessions prior to calling it quits. Do you trust them? Do you feel   understood? Do you think they can help? Check in with yourself after each session    Please reach out to the [facility]([phone]) regarding further mental health appointment needs for this emergency department visit.    [facility] SCHEDULING:  Today you were seen by a licensed mental health professional through [place] and Transition sevices, [facility] ([facility])  for a crisis assessment in the Emergency Department at [facility].  It is recommended that you follow up with your estabished providers (psychiatrist, menta health therapist, and/or primary care doctor - as relevant) as soon as possible. Coordinators from [facility] will be calling you in the next 24-48 hours to ensure that you have the resources you need.  You can so contact [facility] coordinators directly at [phone].     [facility] maintains an extensive network of licensed behavioral health providers to connect patients with the  services they need.  We do not charge providers a fee to participate in our referral network.  We match patients with providers based on a patient s specific needs, insurance coverage, and location.  Our first effort will be to refer you to a provider within your care system, and will utilize providers outside your care system as needed.     Additional information  Today you were seen by a licensed mental health professional through Triage and Transition services, [facility] (P)  for a crisis assessment in the Emergency Department at [facility].  It is recommended that you follow up with your established providers (psychiatrist, mental health therapist, and/or primary care doctor - as relevant) as soon as possible. Coordinators from [facility] will be calling you in the next 24-48 hours to ensure that you have the resources you need.  You can also contact [facility] coordinators directly at [phone]. You may have been scheduled for or offered an appointment with a mental health provider. [facility] maintains an extensive network of licensed behavioral health providers to connect patients with the services they need.  We do not charge providers a fee to participate in our referral network.  We match patients with providers based on a patient's specific needs, insurance coverage, and location.  Our first effort will be to refer you to a provider within your care system, and will utilize providers outside your care system as needed.
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
## 2022-10-19 DIAGNOSIS — Z79.4 TYPE 2 DIABETES MELLITUS WITHOUT COMPLICATION, WITH LONG-TERM CURRENT USE OF INSULIN (H): ICD-10-CM

## 2022-10-19 DIAGNOSIS — E11.9 TYPE 2 DIABETES MELLITUS WITHOUT COMPLICATION, WITH LONG-TERM CURRENT USE OF INSULIN (H): ICD-10-CM

## 2022-10-19 RX ORDER — BLOOD SUGAR DIAGNOSTIC
STRIP MISCELLANEOUS
Qty: 100 STRIP | Refills: 3 | Status: SHIPPED | OUTPATIENT
Start: 2022-10-19 | End: 2023-06-08

## 2022-10-20 ENCOUNTER — VIRTUAL VISIT (OUTPATIENT)
Dept: PSYCHIATRY | Facility: CLINIC | Age: 32
End: 2022-10-20
Attending: PSYCHIATRY & NEUROLOGY
Payer: COMMERCIAL

## 2022-10-20 DIAGNOSIS — Z53.9 ERRONEOUS ENCOUNTER--DISREGARD: Primary | ICD-10-CM

## 2022-10-20 NOTE — PROGRESS NOTES
Patient came on camera but refused to talk. She then went off camera.  Staff encouraged her to continue with the visit but she declined. Patient might do better with in person visit.  We will have her rescheduled for in person visit.

## 2022-10-20 NOTE — PROGRESS NOTES
This video/telephone visit will be conducted via a call between you and your physician/provider. We have found that certain health care needs can be provided without the need for an in-person physical exam. This service lets us provide the care you need with a video /telephone conversation. If a prescription is necessary we can send it directly to your pharmacy. If lab work is needed we can place an order for that and you can then stop by our lab to have the test done at a later time.    Just as we bill insurance for in-person visits, we also bill insurance for video/telephone visits. If you have questions about your insurance coverage, we recommend that you speak with your insurance company.    Patient has given verbal consent for video/Telephone visit? yes    Patient would like a video visit, please connect/call : send link to [phone]  Reason for visit: mh assessment   Anything the provider should be aware of for today's appointment: discuss up and down moods     Patient verified allergies, medications and pharmacy-yes.     JULIO-7 scores:  No flowsheet data found.    PHQ-9 scores: No flowsheet data found.    Patient states they are ready for visit.    Karena Linares MA October 20, 2022 8:28 AM

## 2022-11-03 DIAGNOSIS — F89 DEVELOPMENTAL DISORDER: ICD-10-CM

## 2022-11-04 RX ORDER — HYDROXYZINE HYDROCHLORIDE 50 MG/1
50 TABLET, FILM COATED ORAL AT BEDTIME
Qty: 30 TABLET | Refills: 3 | Status: SHIPPED | OUTPATIENT
Start: 2022-11-04 | End: 2023-05-22

## 2022-11-17 DIAGNOSIS — Z79.4 TYPE 2 DIABETES MELLITUS WITHOUT COMPLICATION, WITH LONG-TERM CURRENT USE OF INSULIN (H): ICD-10-CM

## 2022-11-17 DIAGNOSIS — E11.9 TYPE 2 DIABETES MELLITUS WITHOUT COMPLICATION, WITH LONG-TERM CURRENT USE OF INSULIN (H): ICD-10-CM

## 2022-11-23 DIAGNOSIS — Z79.4 TYPE 2 DIABETES MELLITUS WITHOUT COMPLICATION, WITH LONG-TERM CURRENT USE OF INSULIN (H): ICD-10-CM

## 2022-11-23 DIAGNOSIS — E11.9 TYPE 2 DIABETES MELLITUS WITHOUT COMPLICATION, WITH LONG-TERM CURRENT USE OF INSULIN (H): ICD-10-CM

## 2022-11-23 RX ORDER — PROCHLORPERAZINE 25 MG/1
1 SUPPOSITORY RECTAL
Qty: 3 EACH | Refills: 5 | Status: SHIPPED | OUTPATIENT
Start: 2022-11-23

## 2022-11-23 RX ORDER — PROCHLORPERAZINE 25 MG/1
1 SUPPOSITORY RECTAL
Qty: 1 EACH | Refills: 1 | Status: SHIPPED | OUTPATIENT
Start: 2022-11-23

## 2023-02-15 RX ORDER — INSULIN ASPART 100 [IU]/ML
INJECTION, SOLUTION INTRAVENOUS; SUBCUTANEOUS
Qty: 15 ML | Refills: 1 | OUTPATIENT
Start: 2023-02-15

## 2023-04-04 ENCOUNTER — TELEPHONE (OUTPATIENT)
Dept: FAMILY MEDICINE | Facility: CLINIC | Age: 33
End: 2023-04-04

## 2023-05-02 ENCOUNTER — OFFICE VISIT (OUTPATIENT)
Dept: FAMILY MEDICINE | Facility: CLINIC | Age: 33
End: 2023-05-02
Payer: COMMERCIAL

## 2023-05-02 VITALS
SYSTOLIC BLOOD PRESSURE: 138 MMHG | RESPIRATION RATE: 24 BRPM | WEIGHT: 276.4 LBS | OXYGEN SATURATION: 98 % | HEART RATE: 109 BPM | DIASTOLIC BLOOD PRESSURE: 95 MMHG | BODY MASS INDEX: 44.34 KG/M2

## 2023-05-02 DIAGNOSIS — Z00.00 ROUTINE ADULT HEALTH MAINTENANCE: ICD-10-CM

## 2023-05-02 DIAGNOSIS — N89.8 VAGINAL DISCHARGE: Primary | ICD-10-CM

## 2023-05-02 DIAGNOSIS — E11.65 TYPE 2 DIABETES MELLITUS WITH HYPERGLYCEMIA, WITH LONG-TERM CURRENT USE OF INSULIN (H): ICD-10-CM

## 2023-05-02 DIAGNOSIS — E03.9 HYPOTHYROIDISM, UNSPECIFIED TYPE: ICD-10-CM

## 2023-05-02 DIAGNOSIS — Z79.4 TYPE 2 DIABETES MELLITUS WITH HYPERGLYCEMIA, WITH LONG-TERM CURRENT USE OF INSULIN (H): ICD-10-CM

## 2023-05-02 LAB
ANION GAP SERPL CALCULATED.3IONS-SCNC: 13 MMOL/L (ref 7–15)
BUN SERPL-MCNC: 5.9 MG/DL (ref 6–20)
CALCIUM SERPL-MCNC: 10.1 MG/DL (ref 8.6–10)
CHLORIDE SERPL-SCNC: 104 MMOL/L (ref 98–107)
CHOLEST SERPL-MCNC: 182 MG/DL
CLUE CELLS: NORMAL
CREAT SERPL-MCNC: 0.58 MG/DL (ref 0.51–0.95)
DEPRECATED HCO3 PLAS-SCNC: 23 MMOL/L (ref 22–29)
GFR SERPL CREATININE-BSD FRML MDRD: >90 ML/MIN/1.73M2
GLUCOSE SERPL-MCNC: 70 MG/DL (ref 70–99)
HBA1C MFR BLD: 5.8 % (ref 0–5.6)
HDLC SERPL-MCNC: 39 MG/DL
LDLC SERPL CALC-MCNC: 120 MG/DL
NONHDLC SERPL-MCNC: 143 MG/DL
POTASSIUM SERPL-SCNC: 4.2 MMOL/L (ref 3.4–5.3)
SODIUM SERPL-SCNC: 140 MMOL/L (ref 136–145)
T4 FREE SERPL-MCNC: 1.62 NG/DL (ref 0.9–1.7)
TRICHOMONAS, WET PREP: NORMAL
TRIGL SERPL-MCNC: 114 MG/DL
TSH SERPL DL<=0.005 MIU/L-ACNC: 0.21 UIU/ML (ref 0.3–4.2)
WBC'S/HIGH POWER FIELD, WET PREP: NORMAL
YEAST, WET PREP: NORMAL

## 2023-05-02 PROCEDURE — 86803 HEPATITIS C AB TEST: CPT | Performed by: FAMILY MEDICINE

## 2023-05-02 PROCEDURE — 80061 LIPID PANEL: CPT | Performed by: FAMILY MEDICINE

## 2023-05-02 PROCEDURE — 80048 BASIC METABOLIC PNL TOTAL CA: CPT | Performed by: FAMILY MEDICINE

## 2023-05-02 PROCEDURE — 83036 HEMOGLOBIN GLYCOSYLATED A1C: CPT | Performed by: FAMILY MEDICINE

## 2023-05-02 PROCEDURE — 87210 SMEAR WET MOUNT SALINE/INK: CPT | Performed by: FAMILY MEDICINE

## 2023-05-02 PROCEDURE — 87389 HIV-1 AG W/HIV-1&-2 AB AG IA: CPT | Performed by: FAMILY MEDICINE

## 2023-05-02 PROCEDURE — 84439 ASSAY OF FREE THYROXINE: CPT | Performed by: FAMILY MEDICINE

## 2023-05-02 PROCEDURE — 84443 ASSAY THYROID STIM HORMONE: CPT | Performed by: FAMILY MEDICINE

## 2023-05-02 PROCEDURE — 99214 OFFICE O/P EST MOD 30 MIN: CPT | Performed by: FAMILY MEDICINE

## 2023-05-02 PROCEDURE — 36415 COLL VENOUS BLD VENIPUNCTURE: CPT | Performed by: FAMILY MEDICINE

## 2023-05-02 RX ORDER — PROCHLORPERAZINE 25 MG/1
SUPPOSITORY RECTAL
Qty: 3 EACH | Refills: 5 | Status: SHIPPED | OUTPATIENT
Start: 2023-05-02

## 2023-05-02 RX ORDER — PROCHLORPERAZINE 25 MG/1
SUPPOSITORY RECTAL
Qty: 1 EACH | Refills: 1 | Status: SHIPPED | OUTPATIENT
Start: 2023-05-02

## 2023-05-02 RX ORDER — PROCHLORPERAZINE 25 MG/1
SUPPOSITORY RECTAL
Qty: 1 EACH | Refills: 0 | Status: SHIPPED | OUTPATIENT
Start: 2023-05-02

## 2023-05-02 ASSESSMENT — ANXIETY QUESTIONNAIRES
IF YOU CHECKED OFF ANY PROBLEMS ON THIS QUESTIONNAIRE, HOW DIFFICULT HAVE THESE PROBLEMS MADE IT FOR YOU TO DO YOUR WORK, TAKE CARE OF THINGS AT HOME, OR GET ALONG WITH OTHER PEOPLE: SOMEWHAT DIFFICULT
3. WORRYING TOO MUCH ABOUT DIFFERENT THINGS: MORE THAN HALF THE DAYS
1. FEELING NERVOUS, ANXIOUS, OR ON EDGE: NOT AT ALL
5. BEING SO RESTLESS THAT IT IS HARD TO SIT STILL: NEARLY EVERY DAY
7. FEELING AFRAID AS IF SOMETHING AWFUL MIGHT HAPPEN: NOT AT ALL
2. NOT BEING ABLE TO STOP OR CONTROL WORRYING: SEVERAL DAYS
GAD7 TOTAL SCORE: 7
6. BECOMING EASILY ANNOYED OR IRRITABLE: SEVERAL DAYS
GAD7 TOTAL SCORE: 7

## 2023-05-02 ASSESSMENT — PATIENT HEALTH QUESTIONNAIRE - PHQ9
5. POOR APPETITE OR OVEREATING: NOT AT ALL
SUM OF ALL RESPONSES TO PHQ QUESTIONS 1-9: 9

## 2023-05-02 NOTE — PATIENT INSTRUCTIONS
I'll send some medicine to the pharmacy for your itchiness after the results are back.    Stop at lab.     new sensor from pharmacy.  Don't use it yet.  Bring it with to your appt with the pharmacy doctor, and they'll teach you.  That visit will need to be here at clinic.    A couple weeks after that, we'll do a video visit.  We'll review your labs and your new sensor data.  Then we'll make a plan for your meds with that info.              May 12, 2023  Podiatry  [facility]  [address] [place] [address]  Phone: [phone]  Fax: [phone]    Fax referral, demographic, notes and any supporting images to [phone], they will contact pt to schedule.      Sanjuana Garcia

## 2023-05-02 NOTE — LETTER
May 15, 2023      Apple Holliday  UNKNOWN  [place] [address]        Dear Ms. Holliday,     It was nice to meet you in clinic. Here are your test results:    -- Your diabetes test (A1c) was very good. It was 5.8. Last time it was 7.9, so that's much better. -- Your thyroid test was a little bit outside of the normal range. It looks like 50 mcg is a tiny bit too strong. I sent 25 mcg instead. Still one pill a day. -- Your swab did not show any infections, so I sent a powder to use on the skin to your pharmacy. You should be getting a call from a podiatry office near [place]. For your diabetes, please  the meter from your pharmacy, and give us a call to set up a visit with a PharmD at our clinic to learn how to use it.     Nalini Edge MD    Resulted Orders   Wet preparation   Result Value Ref Range    Trichomonas Absent Absent    Yeast Absent Absent    Clue Cells Absent Absent    WBCs/high power field None None    Narrative    Bacteria few, odor none   Hemoglobin A1c   Result Value Ref Range    Hemoglobin A1C 5.8 (H) 0.0 - 5.6 %      Comment:      Normal <5.7%   Prediabetes 5.7-6.4%    Diabetes 6.5% or higher     Note: Adopted from ADA consensus guidelines.   TSH with free T4 reflex   Result Value Ref Range    TSH 0.21 (L) 0.30 - 4.20 uIU/mL   Basic metabolic panel   Result Value Ref Range    Sodium 140 136 - 145 mmol/L    Potassium 4.2 3.4 - 5.3 mmol/L    Chloride 104 98 - 107 mmol/L    Carbon Dioxide (CO2) 23 22 - 29 mmol/L    Anion Gap 13 7 - 15 mmol/L    Urea Nitrogen 5.9 (L) 6.0 - 20.0 mg/dL    Creatinine 0.58 0.51 - 0.95 mg/dL    Calcium 10.1 (H) 8.6 - 10.0 mg/dL    Glucose 70 70 - 99 mg/dL    GFR Estimate >90 >60 mL/min/1.73m2      Comment:      eGFR calculated using 2021 CKD-EPI equation.   Lipid panel reflex to direct LDL Fasting   Result Value Ref Range    Cholesterol 182 <200 mg/dL    Triglycerides 114 <150 mg/dL    Direct Measure HDL 39 (L) >=50 mg/dL    LDL Cholesterol Calculated 120 (H)  <=100 mg/dL    Non HDL Cholesterol 143 (H) <130 mg/dL    Narrative    Cholesterol  Desirable:  <200 mg/dL    Triglycerides  Normal:  Less than 150 mg/dL  Borderline High:  150-199 mg/dL  High:  200-499 mg/dL  Very High:  Greater than or equal to 500 mg/dL    Direct Measure HDL  Female:  Greater than or equal to 50 mg/dL   Male:  Greater than or equal to 40 mg/dL    LDL Cholesterol  Desirable:  <100mg/dL  Above Desirable:  100-129 mg/dL   Borderline High:  130-159 mg/dL   High:  160-189 mg/dL   Very High:  >= 190 mg/dL    Non HDL Cholesterol  Desirable:  130 mg/dL  Above Desirable:  130-159 mg/dL  Borderline High:  160-189 mg/dL  High:  190-219 mg/dL  Very High:  Greater than or equal to 220 mg/dL   HIV Ag/Ab Screen Cascade   Result Value Ref Range    HIV Antigen Antibody Combo Nonreactive Nonreactive      Comment:      HIV-1 p24 Ag & HIV-1/HIV-2 Ab Not Detected   Hepatitis C Screen Reflex to HCV RNA Quant and Genotype   Result Value Ref Range    Hepatitis C Antibody Nonreactive Nonreactive    Narrative    Assay performance characteristics have not been established for newborns, infants, and children.   T4 free   Result Value Ref Range    Free T4 1.62 0.90 - 1.70 ng/dL       If you have any questions or concerns, please call the clinic at the number listed above.       Sincerely,      Nalini Edge MD

## 2023-05-02 NOTE — PROGRESS NOTES
Assessment & Plan   Vaginal itchiness, no discharge.  Wet prep collected as able, but negative.  Declines exam.  Ordered nystatin powder, presumptively at risk for intertrigo.    Routine follow-up of DM2, well-controlled, with A1c today of 5.8% (5/2/23).  On  Lantus 54 units daily, NovoLog 12 units 3 times daily AC, metformin 2000 mg daily, and exenatide 2 mg weekly.  Does not check sugars because she doesn't like to poke her finger.  -- DM medication plan today: Ordered CGM, she is interested in this as an option to avoid finger sticks.  However, with developmental delay, she would benefit from in-person teaching on its use.  Will schedule with PharmD.  Dexcom chosen because, despite its greater complexity, she previously tried the Mark but didn't like that it didn't stick well.  She is interested in the Dexcom with greater adhesive and that goes on the abdomen. Otherwise, no changes to regimen today.  -- Lipids: Last LDL was 120 (today, 5/2/23).  Not on statin.  Female of childbearing potential.  -- BP: 138/95 today.  On lisinopril 5 mg.  Last BMP (today 5/2/23) shows GFR greater than 90 and normal potassium (4.2).  Continue.  -- Last urine microalbumin: 21 on 5/4/2022.  Before that it was 41.  Will need repeat.  Consider SGLT2 and/or increasing ACEi.  Did not make these changes today, to minimize visit complexity.  -- Aspirin: Not indicated.  --Non-smoker.  -- Podiatry referral.    History of hypothyroidism, on levothyroxine 50 mcg daily.  -- Check TSH.    Healthcare maintenance: HIV and Hep C screens ordered.    Follow-up with PharmD in ~1 week.  Bring CGM that  from pharmacy.  Follow-up with me in 1 month.  Eventually, hopefully we can do some virtual visits by synching the CGM with our clinic, given that they come from [place].    Sami Holliday is a 32 year old female with a history including developmental delay and DM2 on insulin who presents for vaginal discharge and routine diabetes  care.    Regarding her diabetes, her current regimen is Lantus 54 units daily, NovoLog 12 units 3 times daily AC, metformin 2000 mg daily, and exenatide 2 mg weekly.  She does not currently check her sugars, so she doesn't know how her diabetes is doing.  It hurts to poke her finger.  She denies shakiness, chills, or confusion, as if her sugar is low.  She endorses increased thirst but not urinary frequency.  No nausea or vision changes.    Regarding the vaginal discharge, she says that she doesn't actually have discharge, but she has vaginal itchiness.  She tried OTC vagisil without improvement.  She is fearful of having a vaginal exam today.  She denies trauma.  No dysuria or hematuria.    She is accompanied by her father.  They drove here from [place].    Social: She reports that she has never smoked. She has never used smokeless tobacco.    Objective   Vitals: BP (!) 138/95   Pulse 109   Resp 24   Wt 125.4 kg (276 lb 6.4 oz)   SpO2 98%   BMI 44.34 kg/m    General: Pleasant. Young woman. No distress.  Heart: Regular rate and rhythm. No murmurs, rubs, or gallops.  Lungs: Clear to auscultation bilaterally. No wheezes or crackles. Good air movement.  : Declined by patient.  Psych: Appropriate grooming and hygiene. Speech normal rate. Appropriate mood and affect.    Labs:  Results for orders placed or performed in visit on 05/02/23   Hemoglobin A1c     Status: Abnormal   Result Value Ref Range    Hemoglobin A1C 5.8 (H) 0.0 - 5.6 %   TSH with free T4 reflex     Status: Abnormal   Result Value Ref Range    TSH 0.21 (L) 0.30 - 4.20 uIU/mL   Basic metabolic panel     Status: Abnormal   Result Value Ref Range    Sodium 140 136 - 145 mmol/L    Potassium 4.2 3.4 - 5.3 mmol/L    Chloride 104 98 - 107 mmol/L    Carbon Dioxide (CO2) 23 22 - 29 mmol/L    Anion Gap 13 7 - 15 mmol/L    Urea Nitrogen 5.9 (L) 6.0 - 20.0 mg/dL    Creatinine 0.58 0.51 - 0.95 mg/dL    Calcium 10.1 (H) 8.6 - 10.0 mg/dL    Glucose 70 70 - 99 mg/dL     GFR Estimate >90 >60 mL/min/1.73m2   Lipid panel reflex to direct LDL Fasting     Status: Abnormal   Result Value Ref Range    Cholesterol 182 <200 mg/dL    Triglycerides 114 <150 mg/dL    Direct Measure HDL 39 (L) >=50 mg/dL    LDL Cholesterol Calculated 120 (H) <=100 mg/dL    Non HDL Cholesterol 143 (H) <130 mg/dL    Narrative    Cholesterol  Desirable:  <200 mg/dL    Triglycerides  Normal:  Less than 150 mg/dL  Borderline High:  150-199 mg/dL  High:  200-499 mg/dL  Very High:  Greater than or equal to 500 mg/dL    Direct Measure HDL  Female:  Greater than or equal to 50 mg/dL   Male:  Greater than or equal to 40 mg/dL    LDL Cholesterol  Desirable:  <100mg/dL  Above Desirable:  100-129 mg/dL   Borderline High:  130-159 mg/dL   High:  160-189 mg/dL   Very High:  >= 190 mg/dL    Non HDL Cholesterol  Desirable:  130 mg/dL  Above Desirable:  130-159 mg/dL  Borderline High:  160-189 mg/dL  High:  190-219 mg/dL  Very High:  Greater than or equal to 220 mg/dL   HIV Ag/Ab Screen Cascade     Status: Normal   Result Value Ref Range    HIV Antigen Antibody Combo Nonreactive Nonreactive   Hepatitis C Screen Reflex to HCV RNA Quant and Genotype     Status: Normal   Result Value Ref Range    Hepatitis C Antibody Nonreactive Nonreactive    Narrative    Assay performance characteristics have not been established for newborns, infants, and children.   T4 free     Status: Normal   Result Value Ref Range    Free T4 1.62 0.90 - 1.70 ng/dL   Wet preparation     Status: Normal    Specimen: Vagina; Swab   Result Value Ref Range    Trichomonas Absent Absent    Yeast Absent Absent    Clue Cells Absent Absent    WBCs/high power field None None    Narrative    Bacteria few, odor none

## 2023-05-03 LAB
HCV AB SERPL QL IA: NONREACTIVE
HIV 1+2 AB+HIV1 P24 AG SERPL QL IA: NONREACTIVE

## 2023-05-12 RX ORDER — NYSTATIN 100000 [USP'U]/G
POWDER TOPICAL DAILY PRN
Qty: 60 G | Refills: 1 | Status: SHIPPED | OUTPATIENT
Start: 2023-05-12

## 2023-05-14 RX ORDER — LEVOTHYROXINE SODIUM 25 UG/1
25 TABLET ORAL DAILY
Qty: 30 TABLET | Refills: 1 | Status: SHIPPED | OUTPATIENT
Start: 2023-05-14 | End: 2023-06-02

## 2023-05-15 ENCOUNTER — TELEPHONE (OUTPATIENT)
Dept: FAMILY MEDICINE | Facility: CLINIC | Age: 33
End: 2023-05-15
Payer: COMMERCIAL

## 2023-05-15 NOTE — TELEPHONE ENCOUNTER
[facility] phone call message- general phone call:    Reason for call: Alana from [facility] want to let Dr. Edge know - Night stand powder once a day and area applying to.     Return call needed: No    OK to leave a message on voice mail? Yes    Primary language: English      needed? No    Call taken on May 15, 2023 at 9:33 AM by Antonio Bermudez

## 2023-05-22 RX ORDER — DIVALPROEX SODIUM 500 MG/1
1500 TABLET, EXTENDED RELEASE ORAL AT BEDTIME
Qty: 90 TABLET | Refills: 11 | OUTPATIENT
Start: 2023-05-22

## 2023-05-31 DIAGNOSIS — F89 DEVELOPMENTAL DISORDER: ICD-10-CM

## 2023-05-31 DIAGNOSIS — Z79.4 TYPE 2 DIABETES MELLITUS WITH HYPERGLYCEMIA, WITH LONG-TERM CURRENT USE OF INSULIN (H): Primary | ICD-10-CM

## 2023-05-31 DIAGNOSIS — E11.65 TYPE 2 DIABETES MELLITUS WITH HYPERGLYCEMIA, WITH LONG-TERM CURRENT USE OF INSULIN (H): Primary | ICD-10-CM

## 2023-05-31 NOTE — TELEPHONE ENCOUNTER
[facility] phone call message- patient requesting a refill:    Full Medication Name: folic acid levothyroxine     Dose: 1 mg 25 mg     Pharmacy confirmed as       [id number] - [place], [place] - [address]  [address]  [facility] [address]  Phone: [phone] Fax: [phone]  : Yes    Additional Comments: they are still waiting for the medications      OK to leave a message on voice mail? Yes    Primary language: English      needed? No    Call taken on May 31, 2023 at 12:06 PM by Frank Archuleta

## 2023-06-02 ENCOUNTER — VIRTUAL VISIT (OUTPATIENT)
Dept: FAMILY MEDICINE | Facility: CLINIC | Age: 33
End: 2023-06-02
Payer: COMMERCIAL

## 2023-06-02 DIAGNOSIS — E11.65 TYPE 2 DIABETES MELLITUS WITH HYPERGLYCEMIA, WITH LONG-TERM CURRENT USE OF INSULIN (H): ICD-10-CM

## 2023-06-02 DIAGNOSIS — Z76.0 ENCOUNTER FOR MEDICATION REFILL: ICD-10-CM

## 2023-06-02 DIAGNOSIS — Z79.4 TYPE 2 DIABETES MELLITUS WITH HYPERGLYCEMIA, WITH LONG-TERM CURRENT USE OF INSULIN (H): ICD-10-CM

## 2023-06-02 DIAGNOSIS — E03.9 HYPOTHYROIDISM, UNSPECIFIED TYPE: Primary | ICD-10-CM

## 2023-06-02 DIAGNOSIS — L85.3 DRY SKIN: ICD-10-CM

## 2023-06-02 PROCEDURE — 99214 OFFICE O/P EST MOD 30 MIN: CPT | Mod: VID

## 2023-06-02 RX ORDER — LEVOTHYROXINE SODIUM 25 UG/1
25 TABLET ORAL DAILY
Qty: 90 TABLET | Refills: 1 | Status: SHIPPED | OUTPATIENT
Start: 2023-06-02 | End: 2023-06-08

## 2023-06-02 RX ORDER — HYDROXYZINE HYDROCHLORIDE 50 MG/1
50 TABLET, FILM COATED ORAL AT BEDTIME
Qty: 30 TABLET | Refills: 3 | Status: SHIPPED | OUTPATIENT
Start: 2023-06-02 | End: 2023-06-08

## 2023-06-02 RX ORDER — LEVOTHYROXINE SODIUM 25 UG/1
25 TABLET ORAL DAILY
Qty: 30 TABLET | Refills: 1 | Status: SHIPPED | OUTPATIENT
Start: 2023-06-02 | End: 2023-06-02

## 2023-06-02 RX ORDER — FOLIC ACID 1 MG/1
1 TABLET ORAL DAILY
Qty: 90 TABLET | Refills: 3 | Status: SHIPPED | OUTPATIENT
Start: 2023-06-02 | End: 2024-03-05

## 2023-06-02 RX ORDER — DIVALPROEX SODIUM 500 MG/1
1500 TABLET, EXTENDED RELEASE ORAL AT BEDTIME
Qty: 90 TABLET | Refills: 11 | OUTPATIENT
Start: 2023-06-02

## 2023-06-02 RX ORDER — FOLIC ACID 1 MG/1
1 TABLET ORAL DAILY
Qty: 90 TABLET | Refills: 3 | Status: SHIPPED | OUTPATIENT
Start: 2023-06-02 | End: 2023-06-08

## 2023-06-02 NOTE — PROGRESS NOTES
Apple is a 32 year old who is being evaluated via a billable video visit.      How would you like to obtain your AVS? MyChart  If the video visit is dropped, the invitation should be resent by: Text to cell phone: [phone]  Will anyone else be joining your video visit?         Assessment & Plan     Diabetes mellitus, type 2 (H)  Presents via phine visit for follow up for DEXCOM education. She missed Appt on 5/15? Discussed in-person visit with pharmD for education.  Advised to bring DEXA scan device to the appointment on Monday.  -Follow up with PharmD on Monday June 5th   -CONSULTATION/REFERRAL to MTM previous visits.  Appointment scheduled for Monday, June 5    Encounter for medication refill  Hypothyroidism, unspecified type  Type 2 diabetes mellitus with hyperglycemia, with long-term current use of insulin (H)  - levothyroxine (SYNTHROID/LEVOTHROID) 25 MCG tablet  Dispense: 90 tablet; Refill: 1  - folic acid (FOLVITE) 1 MG tablet  Dispense: 90 tablet; Refill: 3    Dry skin  Itching  Reports showering frequently without moisterizing. Subsequent itchy skin. Will start with moisterizer and follow up if not improved.  Recently evaluated for the same and prescribed topical antifungal however has not taken it due to forgetfulnes.   - mineral oil-white petrolatum (EUCERIN/MINERIN) cream  Dispense: 113 g; Refill: 3  -Nystatin topical prescribed at prev visit    Return if symptoms worsen or fail to improve.    Flora Wagoner DO, PGY-2  [facility]    Today I precepted with Dr. Minesh MD, who agrees with the assessment and plan.      Subjective   Apple is a 32 year old, presenting for the following health issues:  Diabetes (DM check talk about getting the DM monitor )        5/2/2023     2:37 PM   Additional Questions   Roomed by E. her MA   Accompanied by Father     HPI         Patient would like to reschedule appt for pharmD to help with Dexcom device. Has gotten the dexcom device and  "requests to go over the device use with someone.       Few concerns/questions:     1. Patient complains of bad smell and odor to her feet for the last 3 months. Spouse reports \"its almost like insulin is escaping the foot causing the odor\". Associated itching. No open sores or blisters. Has been prescribed foot powder that she has not began using due to forgetting. She is amendable to starting the power.     2.  itching skin. Symptoms for the last couple of months. Has not tried anything. She bathes very frequently. Does not use moisturizer.       Review of Systems   Constitutional, HEENT, cardiovascular, pulmonary, gi and gu systems are negative, except as otherwise noted.      Objective           Vitals:  No vitals were obtained today due to virtual visit.    Physical Exam   GENERAL: Healthy, alert and no distress  EYES: Eyes grossly normal to inspection.  No discharge or erythema, or obvious scleral/conjunctival abnormalities.  RESP: No audible wheeze, cough, or visible cyanosis.  No visible retractions or increased work of breathing.    SKIN: Visible skin clear. No significant rash, abnormal pigmentation or lesions.  NEURO: Cranial nerves grossly intact.  Mentation and speech appropriate for age.  PSYCH: Mentation appears normal, affect normal/bright, judgement and insight intact, normal speech and appearance well-groomed.      Video-Visit Details    Type of service:  Video Visit   Video Start Time: 9:20 AM  Video End Time:9:34 AM    Originating Location (pt. Location): [facility]  Distant Location (provider location):  On-site  Platform used for Video Visit: Traci    "

## 2023-06-02 NOTE — PROGRESS NOTES
Preceptor attestation:    I discussed the patient with the resident, and I spoke to the patient by video. I have verified the content of the note, which accurately reflects my assessment of the patient and the plan of care.    Supervising physician: Nalini Edge MD  [facility]

## 2023-06-02 NOTE — PATIENT INSTRUCTIONS
Thank you for trusting us with your care.     Here's a summary of the visit today:   Refills sent to pharmacy as well as moisturizer. If co-pay is too much for moisterizer, you may buy over the counter   Follow up on Monday with our PharmD team to discuss DEXCOM device education   Follow up as needed        Thank you.     Dr. Wagoner

## 2023-06-06 DIAGNOSIS — Z79.4 TYPE 2 DIABETES MELLITUS WITHOUT COMPLICATION, WITH LONG-TERM CURRENT USE OF INSULIN (H): ICD-10-CM

## 2023-06-06 DIAGNOSIS — E11.9 TYPE 2 DIABETES MELLITUS WITHOUT COMPLICATION, WITH LONG-TERM CURRENT USE OF INSULIN (H): ICD-10-CM

## 2023-06-06 NOTE — TELEPHONE ENCOUNTER
[facility] phone call message- patient requesting a refill:    Full Medication Name: novolog pen     Dose: 100 units     Pharmacy confirmed as       [id number] - [place], [place] - [address]  [address]  [facility] [address]  Phone: [phone] Fax: [phone]  : Yes    Additional Comments: the pt is out      OK to leave a message on voice mail? Yes    Primary language: English      needed? No    Call taken on June 6, 2023 at 3:59 PM by Frank Archuleta

## 2023-06-07 RX ORDER — INSULIN GLARGINE 100 [IU]/ML
54 INJECTION, SOLUTION SUBCUTANEOUS EVERY MORNING
Qty: 60 ML | Refills: 3 | Status: CANCELLED | OUTPATIENT
Start: 2023-06-07

## 2023-06-07 RX ORDER — DIVALPROEX SODIUM 500 MG/1
1500 TABLET, EXTENDED RELEASE ORAL AT BEDTIME
Qty: 90 TABLET | Refills: 11 | Status: CANCELLED | OUTPATIENT
Start: 2023-06-07

## 2023-06-07 NOTE — TELEPHONE ENCOUNTER
Pt states she is out of her insulin and needs refills. The divalproex was mentioned that she needs to be seen

## 2023-06-08 ENCOUNTER — OFFICE VISIT (OUTPATIENT)
Dept: FAMILY MEDICINE | Facility: CLINIC | Age: 33
End: 2023-06-08
Payer: COMMERCIAL

## 2023-06-08 VITALS
OXYGEN SATURATION: 100 % | RESPIRATION RATE: 18 BRPM | HEIGHT: 67 IN | BODY MASS INDEX: 42.35 KG/M2 | HEART RATE: 95 BPM | TEMPERATURE: 98 F | DIASTOLIC BLOOD PRESSURE: 85 MMHG | SYSTOLIC BLOOD PRESSURE: 127 MMHG | WEIGHT: 269.8 LBS

## 2023-06-08 DIAGNOSIS — Z79.4 TYPE 2 DIABETES MELLITUS WITHOUT COMPLICATION, WITH LONG-TERM CURRENT USE OF INSULIN (H): ICD-10-CM

## 2023-06-08 DIAGNOSIS — E11.9 TYPE 2 DIABETES MELLITUS WITHOUT COMPLICATION, WITH LONG-TERM CURRENT USE OF INSULIN (H): ICD-10-CM

## 2023-06-08 DIAGNOSIS — F89 DEVELOPMENTAL DISORDER: ICD-10-CM

## 2023-06-08 DIAGNOSIS — K59.00 CONSTIPATION, UNSPECIFIED CONSTIPATION TYPE: Primary | ICD-10-CM

## 2023-06-08 DIAGNOSIS — E03.9 HYPOTHYROIDISM, UNSPECIFIED TYPE: ICD-10-CM

## 2023-06-08 PROCEDURE — 99214 OFFICE O/P EST MOD 30 MIN: CPT | Performed by: FAMILY MEDICINE

## 2023-06-08 RX ORDER — HYDROXYZINE HYDROCHLORIDE 50 MG/1
50 TABLET, FILM COATED ORAL AT BEDTIME
Qty: 30 TABLET | Refills: 11 | Status: SHIPPED | OUTPATIENT
Start: 2023-06-08 | End: 2024-06-14

## 2023-06-08 RX ORDER — INSULIN GLARGINE 100 [IU]/ML
54 INJECTION, SOLUTION SUBCUTANEOUS EVERY MORNING
Qty: 60 ML | Refills: 3 | Status: CANCELLED | OUTPATIENT
Start: 2023-06-08

## 2023-06-08 RX ORDER — DIVALPROEX SODIUM 500 MG/1
1500 TABLET, EXTENDED RELEASE ORAL AT BEDTIME
Qty: 90 TABLET | Refills: 11 | Status: SHIPPED | OUTPATIENT
Start: 2023-06-08 | End: 2024-05-16

## 2023-06-08 RX ORDER — METFORMIN HCL 500 MG
2000 TABLET, EXTENDED RELEASE 24 HR ORAL
Qty: 120 TABLET | Refills: 11 | Status: SHIPPED | OUTPATIENT
Start: 2023-06-08 | End: 2024-06-11

## 2023-06-08 RX ORDER — BLOOD SUGAR DIAGNOSTIC
STRIP MISCELLANEOUS
Qty: 100 STRIP | Refills: 3 | Status: SHIPPED | OUTPATIENT
Start: 2023-06-08 | End: 2023-12-26

## 2023-06-08 RX ORDER — DIVALPROEX SODIUM 500 MG/1
1500 TABLET, EXTENDED RELEASE ORAL AT BEDTIME
Qty: 90 TABLET | Refills: 11 | Status: CANCELLED | OUTPATIENT
Start: 2023-06-08

## 2023-06-08 RX ORDER — LEVOTHYROXINE SODIUM 200 UG/1
200 TABLET ORAL DAILY
Qty: 90 TABLET | Refills: 1 | Status: SHIPPED | OUTPATIENT
Start: 2023-06-08 | End: 2024-03-05

## 2023-06-08 RX ORDER — CALCIUM POLYCARBOPHIL 625 MG 625 MG/1
1 TABLET ORAL DAILY
Qty: 90 TABLET | Refills: 3 | Status: SHIPPED | OUTPATIENT
Start: 2023-06-08

## 2023-06-08 RX ORDER — LEVOTHYROXINE SODIUM 25 UG/1
25 TABLET ORAL DAILY
Qty: 90 TABLET | Refills: 1 | Status: SHIPPED | OUTPATIENT
Start: 2023-06-08 | End: 2024-03-05

## 2023-06-08 RX ORDER — INSULIN GLARGINE 100 [IU]/ML
54 INJECTION, SOLUTION SUBCUTANEOUS EVERY MORNING
Qty: 60 ML | Refills: 3 | Status: SHIPPED | OUTPATIENT
Start: 2023-06-08 | End: 2024-03-05

## 2023-06-09 NOTE — PATIENT INSTRUCTIONS
Picked up Dexcom supplies, but left at home (traveled from [place])  Presents today because was in area and needs Rx (lantus) refilled.  States will return next week for MTM; will bring supplies.          06/13/23   EYE REFERRAL   (Medical insurance not accepted [facility] through [facility])    [facility]  Phone:[phone]  Fax:  [phone]    Faxed demographics and referral to [phone]. They will contact pt to schedule.    Sanjuana Garcia

## 2023-06-09 NOTE — PROGRESS NOTES
"  Assessment & Plan     Constipation, unspecified constipation type    - calcium polycarbophil (FIBERCON) 625 MG tablet; Take 1 tablet (625 mg) by mouth daily    Developmental disorder    - hydrOXYzine (ATARAX) 50 MG tablet; Take 1 tablet (50 mg) by mouth At Bedtime    Type 2 diabetes mellitus without complication, with long-term current use of insulin (H)  Picked up Dexcom supplies, but left at home (traveled from [place])  Presents today because was in area and needs Rx (lantus, Depakope) refilled.  States will return next week for MTM; will bring supplies.  - insulin glargine (LANTUS SOLOSTAR) 100 UNIT/ML pen; Inject 54 Units Subcutaneous every morning  - metFORMIN (GLUCOPHAGE XR) 500 MG 24 hr tablet; Take 4 tablets (2,000 mg) by mouth daily (with dinner)  - blood glucose (ACCU-CHEK GUIDE) test strip; Use to test blood sugar 2-3 times daily or as directed.    Hypothyroidism, unspecified type    - levothyroxine (SYNTHROID/LEVOTHROID) 25 MCG tablet; Take 1 tablet (25 mcg) by mouth daily  - levothyroxine (SYNTHROID/LEVOTHROID) 200 MCG tablet; Take 1 tablet (200 mcg) by mouth daily    Diagnosis or treatment significantly limited by social determinants of health - residence far from clinic  Prescription drug management  27 minutes spent by me on the date of the encounter doing chart review, history and exam, documentation and further activities per the note       BMI:   Estimated body mass index is 42.85 kg/m  as calculated from the following:    Height as of this encounter: 1.69 m (5' 6.54\").    Weight as of this encounter: 122.4 kg (269 lb 12.8 oz).   Weight management plan: Discussed healthy diet and exercise guidelines    Zac Cisneros MD  [facility]    Sami Chiu is a 32 year old, presenting for the following health issues:  Medication Request (Med Refill )        6/8/2023     2:22 PM   Additional Questions   Roomed by eldon   Accompanied by father     HPI     Picked up Dexcom " "supplies, but left at home (traveled from [place])  Presents today because was in area and needs Rx (lantus) refilled.  States will return next week for MTM; will bring supplies.      Review of Systems   Constitutional, HEENT, cardiovascular, pulmonary, GI, , musculoskeletal, neuro, skin, endocrine and psych systems are negative, except as otherwise noted.      Objective    /85 (BP Location: Right arm, Patient Position: Sitting, Cuff Size: Adult Regular)   Pulse 95   Temp 98  F (36.7  C) (Oral)   Resp 18   Ht 1.69 m (5' 6.54\")   Wt 122.4 kg (269 lb 12.8 oz)   LMP 06/01/2023 (Approximate)   SpO2 100%   BMI 42.85 kg/m    Body mass index is 42.85 kg/m .  Physical Exam   GENERAL: healthy, alert and no distress  NECK: no adenopathy, no asymmetry, masses, or scars and thyroid normal to palpation  RESP: lungs clear to auscultation - no rales, rhonchi or wheezes  CV: regular rate and rhythm, normal S1 S2, no S3 or S4, no murmur, click or rub, no peripheral edema and peripheral pulses strong  ABDOMEN: soft, nontender, no hepatosplenomegaly, no masses and bowel sounds normal  MS: no gross musculoskeletal defects noted, no edema                    "

## 2023-06-20 ENCOUNTER — TELEPHONE (OUTPATIENT)
Dept: FAMILY MEDICINE | Facility: CLINIC | Age: 33
End: 2023-06-20
Payer: COMMERCIAL

## 2023-06-20 DIAGNOSIS — E11.65 TYPE 2 DIABETES MELLITUS WITH HYPERGLYCEMIA, UNSPECIFIED WHETHER LONG TERM INSULIN USE (H): Primary | ICD-10-CM

## 2023-06-20 NOTE — TELEPHONE ENCOUNTER
Jonn, dad, called and stated that a pharm D was looking into getting Apple's insulin pen needles that are not covered by her insurance.  He would like a call back.

## 2023-06-20 NOTE — TELEPHONE ENCOUNTER
I tried to call Jonn, dad, for clarification, but was unable to reach him. I called [facility] to see if they could help and they said they don't have an active prescription for insulin pen needles and they were last filled at a [facility] 1 year ago, so I sent in a prescription for insulin pen needles. They should be covered under [facility]. There may have been a misunderstanding about them not being covered by insurance, which was in the message from her dad.  I called Jonn back and to leave a message that I sent in a prescription and that they should be covered but the mailbox was full so I was unable to leave a message.    Selina Nolasco, Pharm.D.

## 2023-07-17 DIAGNOSIS — Z79.4 TYPE 2 DIABETES MELLITUS WITHOUT COMPLICATION, WITH LONG-TERM CURRENT USE OF INSULIN (H): ICD-10-CM

## 2023-07-17 DIAGNOSIS — E11.9 TYPE 2 DIABETES MELLITUS WITHOUT COMPLICATION, WITH LONG-TERM CURRENT USE OF INSULIN (H): ICD-10-CM

## 2023-09-14 ENCOUNTER — TELEPHONE (OUTPATIENT)
Dept: FAMILY MEDICINE | Facility: CLINIC | Age: 33
End: 2023-09-14
Payer: COMMERCIAL

## 2023-09-14 NOTE — TELEPHONE ENCOUNTER
Per pharmacy: How many times is the patient testing her blood sugars? I have lancets with 2-3 times per day and test strips are four times daily. Thanks

## 2023-12-24 DIAGNOSIS — Z79.4 TYPE 2 DIABETES MELLITUS WITHOUT COMPLICATION, WITH LONG-TERM CURRENT USE OF INSULIN (H): ICD-10-CM

## 2023-12-24 DIAGNOSIS — E11.9 TYPE 2 DIABETES MELLITUS WITHOUT COMPLICATION, WITH LONG-TERM CURRENT USE OF INSULIN (H): ICD-10-CM

## 2023-12-26 RX ORDER — BLOOD SUGAR DIAGNOSTIC
STRIP MISCELLANEOUS
Qty: 100 STRIP | Refills: 11 | Status: SHIPPED | OUTPATIENT
Start: 2023-12-26 | End: 2024-03-11

## 2023-12-26 NOTE — TELEPHONE ENCOUNTER
Routing refill request to provider for review/approval because:   Recent (6 mo) or future (30 days) visit within the authorizing provider's specialty     Medication requested: ACCU-CHEK GUIDE TEST STRIPS 100S   Last office visit: 0608/2023  Future [facility] appointments: none  Medication last refilled: 12/17/2023   Last qualifying labs: none    Routed to provider due to failed RN protocol.     RADHA Suarez

## 2023-12-27 DIAGNOSIS — Z79.4 TYPE 2 DIABETES MELLITUS WITHOUT COMPLICATION, WITH LONG-TERM CURRENT USE OF INSULIN (H): Primary | ICD-10-CM

## 2023-12-27 DIAGNOSIS — E11.9 TYPE 2 DIABETES MELLITUS WITHOUT COMPLICATION, WITH LONG-TERM CURRENT USE OF INSULIN (H): Primary | ICD-10-CM

## 2023-12-27 RX ORDER — LANCETS
EACH MISCELLANEOUS
Qty: 200 EACH | Refills: 3 | Status: SHIPPED | OUTPATIENT
Start: 2023-12-27 | End: 2024-03-05

## 2023-12-27 NOTE — TELEPHONE ENCOUNTER
Routing refill request to provider for review/approval because:   Medication is active on med list    Recent (6 mo) or future (30 days) visit within the authorizing provider's specialty       Medication requested: SOFTCLIX LANCETS   Last office visit: 06/08/2023  Future [facility] appointments: none  Medication last refilled: 8/31/2023   Last qualifying labs: none    Routed to provider due to failed RN protocol.     RADHA Suarez

## 2024-02-28 DIAGNOSIS — K21.9 GASTROESOPHAGEAL REFLUX DISEASE, UNSPECIFIED WHETHER ESOPHAGITIS PRESENT: ICD-10-CM

## 2024-02-28 RX ORDER — PANTOPRAZOLE SODIUM 40 MG/1
40 TABLET, DELAYED RELEASE ORAL DAILY
Qty: 30 TABLET | Refills: 1 | Status: SHIPPED | OUTPATIENT
Start: 2024-02-28 | End: 2024-05-13

## 2024-02-28 NOTE — TELEPHONE ENCOUNTER
Medication requested: pantoprazole (PROTONIX) 40 MG EC tablet   Last office visit: 6/8/23  Future [facility] appointments: none  Medication last refilled: 10/19/22  Last qualifying labs: none    Prescription approved per [facility] Refill Protocol.     PPI Protocol [id number]/28/2024 08:38 AM   Protocol Details Medication is active on med list    Medication indicated for associated diagnosis    Recent (12 mo) or future (90 days) visit within the authorizing provider's specialty    Patient is age 18 or older    No active pregnacy on record    No positive pregnancy test in past 12 months        Alisha RN, BSN

## 2024-03-04 ENCOUNTER — TELEPHONE (OUTPATIENT)
Dept: FAMILY MEDICINE | Facility: CLINIC | Age: 34
End: 2024-03-04
Payer: COMMERCIAL

## 2024-03-04 NOTE — TELEPHONE ENCOUNTER
Received fax from Pt's pharmacy requesting for a refill on Atorvastatin 80 mg tabs which is not a current medication on their med list.     Please advise thank you.    Angela Juarez MA

## 2024-03-04 NOTE — TELEPHONE ENCOUNTER
Per chart review-it was unclear as to why patient was previously taking atorvastatin. It was not restarted as of 5/22.     Component      Latest Ref Rng 5/2/2023  3:31 PM   Cholesterol      <200 mg/dL 182    Triglycerides      <150 mg/dL 114    HDL Cholesterol      >=50 mg/dL 39 (L)    LDL Cholesterol Calculated      <=100 mg/dL 120 (H)    Non HDL Cholesterol      <130 mg/dL 143 (H)       Legend:  (L) Low  (H) High    RADHA Garcia

## 2024-03-05 DIAGNOSIS — Z79.4 TYPE 2 DIABETES MELLITUS WITH HYPERGLYCEMIA, WITH LONG-TERM CURRENT USE OF INSULIN (H): ICD-10-CM

## 2024-03-05 DIAGNOSIS — E03.9 HYPOTHYROIDISM, UNSPECIFIED TYPE: ICD-10-CM

## 2024-03-05 DIAGNOSIS — E11.65 TYPE 2 DIABETES MELLITUS WITH HYPERGLYCEMIA, WITH LONG-TERM CURRENT USE OF INSULIN (H): ICD-10-CM

## 2024-03-05 DIAGNOSIS — Z79.4 TYPE 2 DIABETES MELLITUS WITHOUT COMPLICATION, WITH LONG-TERM CURRENT USE OF INSULIN (H): ICD-10-CM

## 2024-03-05 DIAGNOSIS — E11.9 TYPE 2 DIABETES MELLITUS WITHOUT COMPLICATION, WITH LONG-TERM CURRENT USE OF INSULIN (H): ICD-10-CM

## 2024-03-05 NOTE — TELEPHONE ENCOUNTER
[facility] phone call message- patient requesting a refill:    Full Medication Name: LEVOTHYROXINE 200MG AND THE 25MG, lancet solo star, folic acid 1mg     Dose: ?    Pharmacy confirmed as   [id number] - [place] - [address]  [address] [place] [address]  Phone: [phone] Fax: [phone]  : Yes    Additional Comments: they need a two week supply of all of there so that she is covered till her insurance kicks in.     OK to leave a message on voice mail? Yes    Primary language: English      needed? No    Call taken on March 5, 2024 at 10:11 AM by Osvaldo Jerome

## 2024-03-06 RX ORDER — LEVOTHYROXINE SODIUM 25 UG/1
25 TABLET ORAL DAILY
Qty: 90 TABLET | Refills: 1 | Status: SHIPPED | OUTPATIENT
Start: 2024-03-06 | End: 2024-09-23

## 2024-03-06 RX ORDER — INSULIN GLARGINE 100 [IU]/ML
54 INJECTION, SOLUTION SUBCUTANEOUS EVERY MORNING
Qty: 60 ML | Refills: 3 | Status: SHIPPED | OUTPATIENT
Start: 2024-03-06 | End: 2024-06-20

## 2024-03-06 RX ORDER — LANCETS
EACH MISCELLANEOUS
Qty: 200 EACH | Refills: 3 | Status: SHIPPED | OUTPATIENT
Start: 2024-03-06

## 2024-03-06 RX ORDER — FOLIC ACID 1 MG/1
1 TABLET ORAL DAILY
Qty: 90 TABLET | Refills: 3 | Status: SHIPPED | OUTPATIENT
Start: 2024-03-06

## 2024-03-06 RX ORDER — LEVOTHYROXINE SODIUM 200 UG/1
200 TABLET ORAL DAILY
Qty: 90 TABLET | Refills: 1 | Status: SHIPPED | OUTPATIENT
Start: 2024-03-06 | End: 2024-09-04

## 2024-03-11 DIAGNOSIS — E11.9 TYPE 2 DIABETES MELLITUS WITHOUT COMPLICATION, WITH LONG-TERM CURRENT USE OF INSULIN (H): ICD-10-CM

## 2024-03-11 DIAGNOSIS — Z79.4 TYPE 2 DIABETES MELLITUS WITHOUT COMPLICATION, WITH LONG-TERM CURRENT USE OF INSULIN (H): ICD-10-CM

## 2024-03-11 RX ORDER — BLOOD SUGAR DIAGNOSTIC
STRIP MISCELLANEOUS
Qty: 300 STRIP | Refills: 0 | Status: SHIPPED | OUTPATIENT
Start: 2024-03-11 | End: 2024-05-14

## 2024-03-11 NOTE — TELEPHONE ENCOUNTER
Medication requested: ACCU-CHEK GUIDE TEST STRIPS 100S   Last office visit: 6/8/23  Future [facility] appointments: none  Medication last refilled: 6/1/22  Last qualifying labs: none    Routing refill request to provider for review/approval because:    Diabetic Supplies Protocol [id number]/11/2024 12:38 PM   Protocol Details Recent (6 mo) or future (30 days) visit within the authorizing provider's specialty        RADHA Brito, BSN

## 2024-04-12 DIAGNOSIS — Z79.4 TYPE 2 DIABETES MELLITUS WITH HYPERGLYCEMIA, WITH LONG-TERM CURRENT USE OF INSULIN (H): Primary | ICD-10-CM

## 2024-04-12 DIAGNOSIS — E11.65 TYPE 2 DIABETES MELLITUS WITH HYPERGLYCEMIA, WITH LONG-TERM CURRENT USE OF INSULIN (H): Primary | ICD-10-CM

## 2024-04-12 RX ORDER — UBIQUINOL 100 MG
CAPSULE ORAL
Qty: 100 EACH | Refills: 11 | Status: SHIPPED | OUTPATIENT
Start: 2024-04-12

## 2024-05-14 DIAGNOSIS — Z79.4 TYPE 2 DIABETES MELLITUS WITHOUT COMPLICATION, WITH LONG-TERM CURRENT USE OF INSULIN (H): ICD-10-CM

## 2024-05-14 DIAGNOSIS — E11.9 TYPE 2 DIABETES MELLITUS WITHOUT COMPLICATION, WITH LONG-TERM CURRENT USE OF INSULIN (H): ICD-10-CM

## 2024-05-14 RX ORDER — BLOOD SUGAR DIAGNOSTIC
STRIP MISCELLANEOUS
Qty: 300 STRIP | Refills: 0 | Status: SHIPPED | OUTPATIENT
Start: 2024-05-14

## 2024-05-16 DIAGNOSIS — Q86.0 FETAL ALCOHOL SYNDROME: Primary | ICD-10-CM

## 2024-05-16 RX ORDER — DIVALPROEX SODIUM 500 MG/1
TABLET, EXTENDED RELEASE ORAL
Qty: 90 TABLET | Refills: 0 | Status: SHIPPED | OUTPATIENT
Start: 2024-05-16 | End: 2024-06-15

## 2024-05-16 NOTE — TELEPHONE ENCOUNTER
Patient is overdue for exam, >1 year since last visit. Will refill for 30 days with no additional refills at this time. Staff will reach out to attempt to schedule return visit with PCP within the next month.

## 2024-05-16 NOTE — TELEPHONE ENCOUNTER
Requested Renewals     Name from pharmacy: DIVALPROEX EXTENDED RELEASE 500MG T         Will file in chart as: divalproex sodium extended-release (DEPAKOTE ER) 500 MG 24 hr tablet    Sig: TAKE 3 TABLETS(1500 MG) BY MOUTH AT BEDTIME    Disp: 90 tablet    Refills: 11 (Pharmacy requested: Not specified)    Start: 5/16/2024    Class: E-Prescribe    To pharmacy: ZERO refills remain on this prescription. Your patient is requesting advance approval of refills for this medication to PREVENT ANY MISSED DOSES    Last ordered: 11 months ago (6/8/2023) by Zac Cisneros MD    Last refill: 5/12/2024    Rx #: [id number]    Anti-Seizure Meds Protocol  Tuidvm2205/16/2024 08:09 AM   Protocol Details Review Authorizing provider's last note.    Normal CBC on file in past 26 months    Normal ALT or AST on file in past 26 months    Normal platelet count on file in past 26 months    Depakote level within therapeutic range in past 26 months    Recent (12 mo) or future (30 days) visit within the authorizing provider's specialty    Medication is active on med list    No active pregnancy on record    No positive pregnancy test in last 12 months      To be filled at: [id number] - [place], [place] - [address] AT [address]

## 2024-06-11 DIAGNOSIS — E11.9 TYPE 2 DIABETES MELLITUS WITHOUT COMPLICATION, WITH LONG-TERM CURRENT USE OF INSULIN (H): ICD-10-CM

## 2024-06-11 DIAGNOSIS — Z79.4 TYPE 2 DIABETES MELLITUS WITHOUT COMPLICATION, WITH LONG-TERM CURRENT USE OF INSULIN (H): ICD-10-CM

## 2024-06-11 RX ORDER — METFORMIN HCL 500 MG
2000 TABLET, EXTENDED RELEASE 24 HR ORAL
Qty: 120 TABLET | Refills: 11 | Status: SHIPPED | OUTPATIENT
Start: 2024-06-11

## 2024-06-11 NOTE — TELEPHONE ENCOUNTER
Name from pharmacy: METFORMIN ER 500MG 24HR TABS          Will file in chart as: metFORMIN (GLUCOPHAGE XR) 500 MG 24 hr tablet    Sig: TAKE 4 TABLETS(2000 MG) BY MOUTH DAILY WITH DINNER    Disp: 120 tablet    Refills: 11 (Pharmacy requested: Not specified)    Start: 6/11/2024    Class: E-Prescribe    Non-formulary For: Type 2 diabetes mellitus without complication, with long-term current use of insulin (H)    Last ordered: 1 year ago (6/8/2023) by Zac Cisneros MD    Last refill: 5/12/2024    Rx #: [id number]    Biguanide Agents Qnbrrt4606/11/2024 03:17 AM   Protocol Details Patient has documented A1c within the specified period of time.    Has GFR on file in past 12 months and most recent value is normal    Recent (6 mo) or future (90 days) visit within the authorizing provider's specialty        RADHA Brito, BSN

## 2024-06-13 DIAGNOSIS — F89 DEVELOPMENTAL DISORDER: ICD-10-CM

## 2024-06-14 RX ORDER — HYDROXYZINE HYDROCHLORIDE 50 MG/1
50 TABLET, FILM COATED ORAL AT BEDTIME
Qty: 30 TABLET | Refills: 11 | Status: SHIPPED | OUTPATIENT
Start: 2024-06-14

## 2024-06-14 NOTE — TELEPHONE ENCOUNTER
Name from pharmacy: HYDROXYZINE HCL 50MG TABS (WHITE)          Will file in chart as: hydrOXYzine HCl (ATARAX) 50 MG tablet    Sig: TAKE 1 TABLET(50 MG) BY MOUTH AT BEDTIME    Disp: 30 tablet    Refills: 11 (Pharmacy requested: Not specified)    Start: 6/13/2024    Class: E-Prescribe    For: Developmental disorder    Last ordered: 1 year ago (6/8/2023) by Zac Cisneros MD    Last refill: 5/3/2024    Rx #: [id number]    Antihistamines Protocol Uikmsh0506/13/2024 07:50 AM   Protocol Details Recent (12 mo) or future (30 days) visit within the authorizing provider's specialty    Medication indicated for associated diagnosis        RADHA Brito, BSN

## 2024-06-20 DIAGNOSIS — Z79.4 TYPE 2 DIABETES MELLITUS WITHOUT COMPLICATION, WITH LONG-TERM CURRENT USE OF INSULIN (H): ICD-10-CM

## 2024-06-20 DIAGNOSIS — E11.9 TYPE 2 DIABETES MELLITUS WITHOUT COMPLICATION, WITH LONG-TERM CURRENT USE OF INSULIN (H): ICD-10-CM

## 2024-06-20 RX ORDER — INSULIN GLARGINE 100 [IU]/ML
INJECTION, SOLUTION SUBCUTANEOUS
Qty: 48 ML | Refills: 0 | Status: SHIPPED | OUTPATIENT
Start: 2024-06-20 | End: 2024-09-10

## 2024-06-20 NOTE — TELEPHONE ENCOUNTER
Name from pharmacy: LANTUS SOLOSTAR PEN INJ 3ML          Will file in chart as: LANTUS SOLOSTAR 100 UNIT/ML soln    Sig: INJECT 54 UNITS UNDER THE SKIN EVERY MORNING.    Disp: 48 mL    Refills: Not specified    Start: 6/20/2024    Class: E-Prescribe    Non-formulary For: Type 2 diabetes mellitus without complication, with long-term current use of insulin (H)    To pharmacy: If Lantus is not covered by insurance, may substitute Basaglar or Semglee or other insulin glargine product per insurance preference at same dose and frequency.      Last ordered: 3 months ago (3/6/2024) by Pepe Johnston MD    Last refill: 6/20/2024    Rx #: [id number]    Insulin Protocol Rjbyyo3706/20/2024 09:25 AM   Protocol Details Has GFR on file in past 12 months and most recent value is normal    Recent (6 mo) or future (90 days) visit within the authorizing provider's specialty    Chart Review Required        RADHA Brito, BSN

## 2024-06-20 NOTE — TELEPHONE ENCOUNTER
Patient also needs insulin pen needle (32G X 4 MM) 32G X 4 MM miscellaneous - Use 4 pen needles daily or as directed and Alcohol Swabs (ALCOHOL PREP) 70 % PADS - USE 1 PAD AS DIRECTED.

## 2024-07-09 DIAGNOSIS — K21.9 GASTROESOPHAGEAL REFLUX DISEASE, UNSPECIFIED WHETHER ESOPHAGITIS PRESENT: ICD-10-CM

## 2024-07-10 RX ORDER — PANTOPRAZOLE SODIUM 40 MG/1
40 TABLET, DELAYED RELEASE ORAL DAILY
Qty: 30 TABLET | Refills: 0 | Status: SHIPPED | OUTPATIENT
Start: 2024-07-10 | End: 2024-08-20

## 2024-08-18 DIAGNOSIS — K21.9 GASTROESOPHAGEAL REFLUX DISEASE, UNSPECIFIED WHETHER ESOPHAGITIS PRESENT: ICD-10-CM

## 2024-08-20 RX ORDER — PANTOPRAZOLE SODIUM 40 MG/1
40 TABLET, DELAYED RELEASE ORAL DAILY
Qty: 30 TABLET | Refills: 0 | Status: SHIPPED | OUTPATIENT
Start: 2024-08-20 | End: 2024-10-02

## 2024-08-20 NOTE — TELEPHONE ENCOUNTER
Name from pharmacy: PANTOPRAZOLE 40MG TABLETS          Will file in chart as: pantoprazole (PROTONIX) 40 MG EC tablet    Sig: TAKE 1 TABLET(40 MG) BY MOUTH DAILY    Disp: 30 tablet    Refills: 0 (Pharmacy requested: Not specified)    Start: 8/18/2024    Class: E-Prescribe    Non-formulary For: Gastroesophageal reflux disease, unspecified whether esophagitis present    Last ordered: 1 month ago (7/10/2024) by Zac Cisneros MD    Last refill: 7/10/2024    Rx #: [id number]    PPI Protocol Efuwdp3108/18/2024 03:16 AM   Protocol Details Recent (12 mo) or future (90 days) visit within the authorizing provider's specialty        RADHA Brito, BSN

## 2024-08-24 DIAGNOSIS — K21.9 GASTROESOPHAGEAL REFLUX DISEASE, UNSPECIFIED WHETHER ESOPHAGITIS PRESENT: ICD-10-CM

## 2024-08-26 RX ORDER — PANTOPRAZOLE SODIUM 40 MG/1
40 TABLET, DELAYED RELEASE ORAL DAILY
Qty: 30 TABLET | Refills: 0 | OUTPATIENT
Start: 2024-08-26

## 2024-09-04 DIAGNOSIS — E03.9 HYPOTHYROIDISM, UNSPECIFIED TYPE: ICD-10-CM

## 2024-09-04 RX ORDER — LEVOTHYROXINE SODIUM 200 UG/1
200 TABLET ORAL DAILY
Qty: 30 TABLET | Refills: 0 | Status: SHIPPED | OUTPATIENT
Start: 2024-09-04

## 2024-09-04 RX ORDER — LEVOTHYROXINE SODIUM 200 UG/1
200 TABLET ORAL DAILY
Qty: 90 TABLET | Refills: 0 | Status: SHIPPED | OUTPATIENT
Start: 2024-09-04 | End: 2024-09-04

## 2024-09-04 NOTE — TELEPHONE ENCOUNTER
Patient is overdue for an exam it has been greater than 1 year since last office visit.  Will refill for 30 days with no additional refills at this time.  Staff will reach out to attempt to schedule an office visit with PCP by the end of the month.    Tej Contreras MD  PGY 2

## 2024-09-23 DIAGNOSIS — E03.9 HYPOTHYROIDISM, UNSPECIFIED TYPE: ICD-10-CM

## 2024-09-23 RX ORDER — LEVOTHYROXINE SODIUM 25 UG/1
25 TABLET ORAL DAILY
Qty: 30 TABLET | Refills: 0 | Status: SHIPPED | OUTPATIENT
Start: 2024-09-23

## 2024-09-23 NOTE — TELEPHONE ENCOUNTER
UNABLE TO REACH PT VIA CONTACT NUMBERS.    NO VOICEMAIL SET UP.    PT HAS ALSO NOT BEEN SEEN IN OVER A YEAR.    PLEASE ADVISE FURTHER REFILLS TO HEAD CLINIC PROVIDER BEFORE REFILLING.      Nic Mcdaniel RN, MSN

## 2024-09-23 NOTE — TELEPHONE ENCOUNTER
Name from pharmacy: LEVOTHYROXINE 0.025MG (25MCG) TAB          Will file in chart as: levothyroxine (SYNTHROID/LEVOTHROID) 25 MCG tablet     Possible duplicate: Donald to review recent actions on this medication    Sig: TAKE 1 TABLET(25 MCG) BY MOUTH DAILY    Disp: 30 tablet    Refills: Not specified    Start: 9/23/2024    Class: E-Prescribe    Non-formulary For: Hypothyroidism, unspecified type    Last ordered: 6 months ago (3/6/2024) by Pepe Johnston MD    Last refill: 3/11/2024    Rx #: [id number]    Thyroid Protocol Yvlqxa4509/23/2024 12:49 PM   Protocol Details Recent (12 mo) or future (30 days) visit within the authorizing provider's specialty    Normal TSH on file in past 12 months          Nic Mcdaniel RN, MSN

## 2024-09-27 DIAGNOSIS — K21.9 GASTROESOPHAGEAL REFLUX DISEASE, UNSPECIFIED WHETHER ESOPHAGITIS PRESENT: ICD-10-CM

## 2024-09-27 RX ORDER — PANTOPRAZOLE SODIUM 40 MG/1
40 TABLET, DELAYED RELEASE ORAL DAILY
Qty: 30 TABLET | Refills: 0 | OUTPATIENT
Start: 2024-09-27

## 2024-09-27 NOTE — TELEPHONE ENCOUNTER
Name from pharmacy: PANTOPRAZOLE 40MG TABLETS          Will file in chart as: pantoprazole (PROTONIX) 40 MG EC tablet    Sig: TAKE 1 TABLET(40 MG) BY MOUTH DAILY    Disp: 30 tablet    Refills: 0 (Pharmacy requested: Not specified)    Start: 9/27/2024    Class: E-Prescribe    Non-formulary For: Gastroesophageal reflux disease, unspecified whether esophagitis present    Last ordered: 1 month ago (8/20/2024) by Devin Loja MD    Last refill: 8/20/2024    Rx #: [id number]    PPI Protocol Kwnylr7009/27/2024 10:30 AM   Protocol Details Recent (12 mo) or future (90 days) visit within the authorizing provider's specialty        RADHA Brito, BSN

## 2024-10-02 RX ORDER — PANTOPRAZOLE SODIUM 40 MG/1
40 TABLET, DELAYED RELEASE ORAL DAILY
Qty: 30 TABLET | Refills: 0 | Status: SHIPPED | OUTPATIENT
Start: 2024-10-02 | End: 2024-10-22

## 2024-10-15 DIAGNOSIS — E11.65 TYPE 2 DIABETES MELLITUS WITH HYPERGLYCEMIA, UNSPECIFIED WHETHER LONG TERM INSULIN USE (H): ICD-10-CM

## 2024-10-15 RX ORDER — PEN NEEDLE, DIABETIC 32GX 5/32"
NEEDLE, DISPOSABLE MISCELLANEOUS
Qty: 100 EACH | Refills: 3 | Status: SHIPPED | OUTPATIENT
Start: 2024-10-15

## 2024-10-15 NOTE — TELEPHONE ENCOUNTER
Name from pharmacy: B-D NOEMI 2ND GEN PEN NDL [id number]          Will file in chart as: BD PEN NEEDLE NOEMI 2ND GEN 32G X 4 MM miscellaneous    Sig: USE 4 PEN NEEDLES DAILY OR AS DIRECTED.    Disp: Not specified (Pharmacy requested: 100 Units)    Refills: Not specified    Start: 10/15/2024    Class: E-Prescribe    Non-formulary For: Type 2 diabetes mellitus with hyperglycemia, unspecified whether long term insulin use (H)    Last ordered: 1 year ago (6/20/2023) by Shabbir Fowler MD    Last refill: 9/11/2024    Rx #: [id number]    Diabetic Supplies Protocol Mkrrak93/15/2024 11:00 AM   Protocol Details Recent (12 month) or future (90 days) visit with authorizing provider s specialty      Nic Mcdaniel RN, MSN

## 2024-10-17 DIAGNOSIS — K21.9 GASTROESOPHAGEAL REFLUX DISEASE, UNSPECIFIED WHETHER ESOPHAGITIS PRESENT: ICD-10-CM

## 2024-10-17 DIAGNOSIS — E03.9 HYPOTHYROIDISM, UNSPECIFIED TYPE: ICD-10-CM

## 2024-10-17 DIAGNOSIS — Q86.0 FETAL ALCOHOL SYNDROME: ICD-10-CM

## 2024-10-17 NOTE — TELEPHONE ENCOUNTER
Name from pharmacy: DIVALPROEX EXTENDED RELEASE 500MG T          Will file in chart as: divalproex sodium extended-release (DEPAKOTE ER) 500 MG 24 hr tablet    Sig: TAKE 3 TABLETS(1500 MG) BY MOUTH AT BEDTIME    Disp: 90 tablet    Refills: 0 (Pharmacy requested: Not specified)    Start: 10/17/2024    Class: E-Prescribe    For: Fetal alcohol syndrome    Last ordered: 5 months ago (5/16/2024) by Nikki Downing DO    Last refill: 7/20/2024    Rx #: [id number]    Anti-Seizure Meds Protocol  Dcjnxk82/17/2024 03:17 AM   Protocol Details Review Authorizing provider's last note.    Normal ALT or AST on file in past 26 months    Depakote level within therapeutic range in past 26 months    Has GFR on file in past 12 months and most recent value is normal    Recent (12 mo) or future (90 days) visit within the authorizing provider's specialty    Medication indicated for associated diagnosis

## 2024-10-22 RX ORDER — DIVALPROEX SODIUM 500 MG/1
TABLET, FILM COATED, EXTENDED RELEASE ORAL
Qty: 90 TABLET | Refills: 0 | Status: SHIPPED | OUTPATIENT
Start: 2024-10-22 | End: 2024-11-21

## 2024-10-22 RX ORDER — LEVOTHYROXINE SODIUM 200 UG/1
200 TABLET ORAL DAILY
Qty: 30 TABLET | Refills: 0 | Status: SHIPPED | OUTPATIENT
Start: 2024-10-22

## 2024-10-22 RX ORDER — LEVOTHYROXINE SODIUM 25 UG/1
25 TABLET ORAL DAILY
Qty: 30 TABLET | Refills: 0 | Status: SHIPPED | OUTPATIENT
Start: 2024-10-22

## 2024-10-22 RX ORDER — PANTOPRAZOLE SODIUM 40 MG/1
40 TABLET, DELAYED RELEASE ORAL DAILY
Qty: 30 TABLET | Refills: 0 | Status: SHIPPED | OUTPATIENT
Start: 2024-10-22

## 2024-10-22 NOTE — TELEPHONE ENCOUNTER
Pt is still waiting on this medication. Plus she needs a refill of levothyroxine (SYNTHROID/LEVOTHROID) 25 MCG tablet - TAKE 1 TABLET(25 MCG) BY MOUTH DAILY - Oral     levothyroxine (SYNTHROID/LEVOTHROID) 200 MCG tablet - Take 1 tablet (200 mcg) by mouth daily. - Oral and    pantoprazole (PROTONIX) 40 MG EC tablet - Take 1 tablet (40 mg) by mouth daily. - Oral     Pt is currently out of medication.  If this cannot be done for some reason, she will need at least a weeks worth of medication to get her by until she can get in.    Please send to [facility] on [address] in [place].    Please call when completed at [phone].

## 2024-10-22 NOTE — TELEPHONE ENCOUNTER
Please advise. Pt is requesting refill on medications.     divalproex sodium extended-release (DEPAKOTE ER) 500 MG 24 hr tablet,   pantoprazole (PROTONIX) 40 MG EC tablet    levothyroxine (SYNTHROID/LEVOTHROID) 25 MCG tablet,   levothyroxine (SYNTHROID/LEVOTHROID) 200 MCG tablet,         I am sending you this as 3/1 PCS for that shift. Please advise what are the next steps.   When done please CLOSE ENCOUNTER to signify that you are done. Thanks.     Route messages to:    Urgent:  BTMAROONRN or BTGOLDRN (RN Pool) and/or BTSTAFF (PCS Pool)     Non-Urgent:   Please route it back to your primary PCS for any additional action if needed.        Natalia Bermudez CMA on 10/22/2024 at 2:45 PM

## 2024-10-24 NOTE — TELEPHONE ENCOUNTER
Attempted to call and left message for pt to call clinic back. Natalia PALMA    Pt needs to schedule follow up medications with PCP.

## 2024-10-25 ENCOUNTER — TELEPHONE (OUTPATIENT)
Dept: FAMILY MEDICINE | Facility: CLINIC | Age: 34
End: 2024-10-25
Payer: COMMERCIAL

## 2024-10-25 NOTE — TELEPHONE ENCOUNTER
10/25/24    Spoke to Patient regarding excessive no-shows. Reviewed No-show policy and the importance of coming to their appointments. They have voiced understanding and have agreed to call or use their MyChart to cancel their appointments. They are aware if they continue to no-show appointments, they may only be eligible for same day appointments, if available.    Was MyChart offered to patient?  No, Pt declined/refused to use MyChart.    Was Text Reminders offered to patient?  Yes. Verified Patient is receiving text reminders and reviewed Communication Preferences.    Osvaldo Jerome on 10/25/2024 at 11:38 AM

## 2024-12-02 ENCOUNTER — VIRTUAL VISIT (OUTPATIENT)
Dept: FAMILY MEDICINE | Facility: CLINIC | Age: 34
End: 2024-12-02
Payer: COMMERCIAL

## 2024-12-02 DIAGNOSIS — Z79.4 TYPE 2 DIABETES MELLITUS WITHOUT COMPLICATION, WITH LONG-TERM CURRENT USE OF INSULIN (H): ICD-10-CM

## 2024-12-02 DIAGNOSIS — Z79.4 TYPE 2 DIABETES MELLITUS WITH HYPERGLYCEMIA, WITH LONG-TERM CURRENT USE OF INSULIN (H): Primary | ICD-10-CM

## 2024-12-02 DIAGNOSIS — F89 DEVELOPMENTAL DISORDER: ICD-10-CM

## 2024-12-02 DIAGNOSIS — E11.65 TYPE 2 DIABETES MELLITUS WITH HYPERGLYCEMIA, WITH LONG-TERM CURRENT USE OF INSULIN (H): Primary | ICD-10-CM

## 2024-12-02 DIAGNOSIS — E11.65 TYPE 2 DIABETES MELLITUS WITH HYPERGLYCEMIA, UNSPECIFIED WHETHER LONG TERM INSULIN USE (H): ICD-10-CM

## 2024-12-02 DIAGNOSIS — Q86.0 FETAL ALCOHOL SYNDROME: ICD-10-CM

## 2024-12-02 DIAGNOSIS — E03.9 HYPOTHYROIDISM, UNSPECIFIED TYPE: ICD-10-CM

## 2024-12-02 DIAGNOSIS — E11.9 TYPE 2 DIABETES MELLITUS WITHOUT COMPLICATION, WITH LONG-TERM CURRENT USE OF INSULIN (H): ICD-10-CM

## 2024-12-02 DIAGNOSIS — K21.9 GASTROESOPHAGEAL REFLUX DISEASE, UNSPECIFIED WHETHER ESOPHAGITIS PRESENT: ICD-10-CM

## 2024-12-02 RX ORDER — ATORVASTATIN CALCIUM 40 MG/1
40 TABLET, FILM COATED ORAL DAILY
Qty: 90 TABLET | Refills: 1 | Status: SHIPPED | OUTPATIENT
Start: 2024-12-02

## 2024-12-02 RX ORDER — LEVOTHYROXINE SODIUM 200 UG/1
200 TABLET ORAL DAILY
Qty: 90 TABLET | Refills: 1 | Status: SHIPPED | OUTPATIENT
Start: 2024-12-02

## 2024-12-02 RX ORDER — INSULIN GLARGINE 100 [IU]/ML
54 INJECTION, SOLUTION SUBCUTANEOUS EVERY MORNING
Qty: 45 ML | Refills: 1 | Status: SHIPPED | OUTPATIENT
Start: 2024-12-02

## 2024-12-02 RX ORDER — PANTOPRAZOLE SODIUM 40 MG/1
40 TABLET, DELAYED RELEASE ORAL DAILY
Qty: 90 TABLET | Refills: 1 | Status: SHIPPED | OUTPATIENT
Start: 2024-12-02

## 2024-12-02 RX ORDER — LEVOTHYROXINE SODIUM 25 UG/1
25 TABLET ORAL DAILY
Qty: 90 TABLET | Refills: 1 | Status: SHIPPED | OUTPATIENT
Start: 2024-12-02

## 2024-12-02 RX ORDER — DIVALPROEX SODIUM 500 MG/1
1500 TABLET, FILM COATED, EXTENDED RELEASE ORAL AT BEDTIME
Qty: 270 TABLET | Refills: 1 | Status: SHIPPED | OUTPATIENT
Start: 2024-12-02

## 2024-12-02 RX ORDER — FOLIC ACID 1 MG/1
1 TABLET ORAL DAILY
Qty: 90 TABLET | Refills: 1 | Status: SHIPPED | OUTPATIENT
Start: 2024-12-02

## 2024-12-02 RX ORDER — LANCETS
EACH MISCELLANEOUS
Qty: 300 EACH | Refills: 3 | Status: SHIPPED | OUTPATIENT
Start: 2024-12-02

## 2024-12-02 RX ORDER — METFORMIN HYDROCHLORIDE 500 MG/1
2000 TABLET, EXTENDED RELEASE ORAL
Qty: 360 TABLET | Refills: 1 | Status: SHIPPED | OUTPATIENT
Start: 2024-12-02

## 2024-12-02 RX ORDER — PEN NEEDLE, DIABETIC 32GX 5/32"
NEEDLE, DISPOSABLE MISCELLANEOUS
Qty: 100 EACH | Refills: 3 | Status: SHIPPED | OUTPATIENT
Start: 2024-12-02

## 2024-12-02 RX ORDER — HYDROXYZINE HYDROCHLORIDE 50 MG/1
50 TABLET, FILM COATED ORAL AT BEDTIME
Qty: 90 TABLET | Refills: 1 | Status: SHIPPED | OUTPATIENT
Start: 2024-12-02

## 2024-12-02 NOTE — PROGRESS NOTES
Apple is a 34 year old who is being evaluated via a billable telephone visit.    What phone number would you like to be contacted at? [phone]  How would you like to obtain your AVS? Mail a copy  Originating Location (pt. Location): Home    Distant Location (provider location):  On-site    Diagnoses and associated orders for this visit:  Type 2 diabetes mellitus with hyperglycemia, with long-term current use of insulin (H)  -     folic acid (FOLVITE) 1 MG tablet; Take 1 tablet (1 mg) by mouth daily.  -     Hemoglobin A1c; Future  -     Basic metabolic panel; Future  -     Lipid Profile; Future  -     Albumin Random Urine Quantitative with Creat Ratio; Future    Developmental disorder  -     hydrOXYzine HCl (ATARAX) 50 MG tablet; Take 1 tablet (50 mg) by mouth at bedtime.    Type 2 diabetes mellitus without complication, with long-term current use of insulin (H)  -     insulin glargine (LANTUS SOLOSTAR) 100 UNIT/ML pen; Inject 54 Units subcutaneously every morning.  -     metFORMIN (GLUCOPHAGE XR) 500 MG 24 hr tablet; Take 4 tablets (2,000 mg) by mouth daily (with dinner).  -     insulin aspart (NOVOLOG PEN) 100 UNIT/ML pen; Inject 14 Units subcutaneously 3 times daily (with meals).  -     blood glucose monitoring (SOFTCLIX) lancets; USE TO TEST BLOOD SUGAR FOUR TIMES DAILY OR AS DIRECTED.  -     blood glucose (ACCU-CHEK GUIDE) test strip; USE TO TEST BLOOD SUGAR FOUR TIMES DAILY AS DIRECTED    Hypothyroidism, unspecified type  -     levothyroxine (SYNTHROID/LEVOTHROID) 200 MCG tablet; Take 1 tablet (200 mcg) by mouth daily.  -     levothyroxine (SYNTHROID/LEVOTHROID) 25 MCG tablet; Take 1 tablet (25 mcg) by mouth daily.  -     TSH with free T4 reflex; Future    Fetal alcohol syndrome  -     divalproex sodium extended-release (DEPAKOTE ER) 500 MG 24 hr tablet; Take 3 tablets (1,500 mg) by mouth at bedtime.    Gastroesophageal reflux disease, unspecified whether esophagitis present  -     pantoprazole (PROTONIX)  40 MG EC tablet; Take 1 tablet (40 mg) by mouth daily.    Type 2 diabetes mellitus with hyperglycemia, unspecified whether long term insulin use (H)  -     atorvastatin (LIPITOR) 40 MG tablet; Take 1 tablet (40 mg) by mouth daily.  -     insulin pen needle (BD PEN NEEDLE NOEMI 2ND GEN) 32G X 4 MM miscellaneous; Use 4 pen needles daily or as directed.    Patient was very happy to hear that her last A1c, albeit 18 months ago, was very well-controlled.  Advised her that it is likely her medication regimen could be simplified if her diabetes remains so well-controlled.  I have placed orders for future labs and encouraged them to follow-up in person to discuss diabetes management and review health maintenance.    Sami Chiu is a 34 year old, presenting for the following health issues:  Refill Request        12/2/2024    11:56 AM   Additional Questions   Roomed by Charis   Accompanied by patient and family member         12/2/2024    Information    services provided? No        HPI       This is a 34-year-old not previously known to me.  Her father is also present on the phone call and appears to assist patient with her health care management.  They report that they live in [facility] which is about 70 miles from the clinic making it difficult to attend in person.  They need refills on their medications.  They agree to have labs drawn in the next few weeks.  We discussed the fact that her last blood work was obtained in May 2023 and that typically diabetics on insulin should have labs drawn at least every 6 months if not more frequently.  Nonetheless her last A1c was very satisfactory.  She believes her blood sugars are currently well-controlled and has no acute symptoms.      Review of Systems  Neuro: Based on problem list and on conversation with patient, she appears at least mildly cognitively impaired and needs the assistance of her father to confirm which medications she is  taking.      Objective           Vitals:  No vitals were obtained today due to virtual visit.    Physical Exam   General: Alert and no distress //Respiratory: No audible wheeze, cough, or shortness of breath // Psychiatric:  Appropriate affect, tone, and pace of words      Lab Results   Component Value Date    A1C 5.8 05/02/2023    A1C 7.9 12/22/2021    A1C 5.9 04/15/2003    A1C 6.1 03/05/2003     I spent the entire visit reviewing her medications, confirming what she is taking and refilling these.      Phone call duration: 14 minutes.  Including postencounter documentation and orders, total encounter time: 19 minutes.  Signed Electronically by: Frank Perry MD

## 2025-02-12 ENCOUNTER — TELEPHONE (OUTPATIENT)
Dept: FAMILY MEDICINE | Facility: CLINIC | Age: 35
End: 2025-02-12
Payer: COMMERCIAL

## 2025-02-12 NOTE — TELEPHONE ENCOUNTER
Patient Quality Outreach    Patient is due for the following:   Diabetes -  A1C    Action(s) Taken:   Schedule a office visit for DM, but no answer    Type of outreach:    Phone, Unable to left message due to no VM set up    Questions for provider review:    None           ОЛЕГ PAW, MA

## 2025-08-19 ENCOUNTER — TELEPHONE (OUTPATIENT)
Dept: FAMILY MEDICINE | Facility: CLINIC | Age: 35
End: 2025-08-19
Payer: COMMERCIAL